# Patient Record
Sex: FEMALE | Race: BLACK OR AFRICAN AMERICAN | Employment: FULL TIME | URBAN - METROPOLITAN AREA
[De-identification: names, ages, dates, MRNs, and addresses within clinical notes are randomized per-mention and may not be internally consistent; named-entity substitution may affect disease eponyms.]

---

## 2017-03-03 DIAGNOSIS — Z12.39 BREAST CANCER SCREENING: Primary | ICD-10-CM

## 2017-04-22 ENCOUNTER — OFFICE VISIT (OUTPATIENT)
Dept: FAMILY MEDICINE CLINIC | Age: 52
End: 2017-04-22

## 2017-04-22 VITALS
OXYGEN SATURATION: 98 % | BODY MASS INDEX: 39.82 KG/M2 | TEMPERATURE: 97.8 F | WEIGHT: 232 LBS | DIASTOLIC BLOOD PRESSURE: 80 MMHG | RESPIRATION RATE: 22 BRPM | SYSTOLIC BLOOD PRESSURE: 136 MMHG | HEART RATE: 84 BPM

## 2017-04-22 DIAGNOSIS — E11.21 TYPE 2 DIABETES MELLITUS WITH DIABETIC NEPHROPATHY, WITHOUT LONG-TERM CURRENT USE OF INSULIN (HCC): ICD-10-CM

## 2017-04-22 DIAGNOSIS — D25.9 UTERINE LEIOMYOMA, UNSPECIFIED LOCATION: ICD-10-CM

## 2017-04-22 DIAGNOSIS — J06.9 UPPER RESPIRATORY TRACT INFECTION, UNSPECIFIED TYPE: Primary | ICD-10-CM

## 2017-04-22 DIAGNOSIS — R19.00 INTRA-ABDOMINAL AND PELVIC SWELLING, MASS AND LUMP, UNSPECIFIED SITE: ICD-10-CM

## 2017-04-22 LAB
A/G RATIO: 1.3 (ref 1.1–2.2)
ALBUMIN SERPL-MCNC: 4.4 G/DL (ref 3.4–5)
ALP BLD-CCNC: 80 U/L (ref 40–129)
ALT SERPL-CCNC: 24 U/L (ref 10–40)
ANION GAP SERPL CALCULATED.3IONS-SCNC: 15 MMOL/L (ref 3–16)
AST SERPL-CCNC: 36 U/L (ref 15–37)
BASOPHILS ABSOLUTE: 0 K/UL (ref 0–0.2)
BASOPHILS RELATIVE PERCENT: 0.3 %
BILIRUB SERPL-MCNC: 0.4 MG/DL (ref 0–1)
BUN BLDV-MCNC: 13 MG/DL (ref 7–20)
CALCIUM SERPL-MCNC: 9.9 MG/DL (ref 8.3–10.6)
CHLORIDE BLD-SCNC: 99 MMOL/L (ref 99–110)
CO2: 27 MMOL/L (ref 21–32)
CREAT SERPL-MCNC: 0.8 MG/DL (ref 0.6–1.1)
EOSINOPHILS ABSOLUTE: 0.3 K/UL (ref 0–0.6)
EOSINOPHILS RELATIVE PERCENT: 4.8 %
GFR AFRICAN AMERICAN: >60
GFR NON-AFRICAN AMERICAN: >60
GLOBULIN: 3.3 G/DL
GLUCOSE BLD-MCNC: 118 MG/DL (ref 70–99)
HCT VFR BLD CALC: 37.9 % (ref 36–48)
HEMOGLOBIN: 12 G/DL (ref 12–16)
INFLUENZA A ANTIBODY: NORMAL
INFLUENZA B ANTIBODY: NORMAL
LYMPHOCYTES ABSOLUTE: 1.2 K/UL (ref 1–5.1)
LYMPHOCYTES RELATIVE PERCENT: 21.8 %
MCH RBC QN AUTO: 28 PG (ref 26–34)
MCHC RBC AUTO-ENTMCNC: 31.7 G/DL (ref 31–36)
MCV RBC AUTO: 88.5 FL (ref 80–100)
MONOCYTES ABSOLUTE: 0.4 K/UL (ref 0–1.3)
MONOCYTES RELATIVE PERCENT: 8.1 %
NEUTROPHILS ABSOLUTE: 3.6 K/UL (ref 1.7–7.7)
NEUTROPHILS RELATIVE PERCENT: 65 %
PDW BLD-RTO: 15.1 % (ref 12.4–15.4)
PLATELET # BLD: 216 K/UL (ref 135–450)
PMV BLD AUTO: 10.4 FL (ref 5–10.5)
POTASSIUM SERPL-SCNC: 4.4 MMOL/L (ref 3.5–5.1)
RBC # BLD: 4.28 M/UL (ref 4–5.2)
SODIUM BLD-SCNC: 141 MMOL/L (ref 136–145)
TOTAL PROTEIN: 7.7 G/DL (ref 6.4–8.2)
WBC # BLD: 5.5 K/UL (ref 4–11)

## 2017-04-22 PROCEDURE — 87804 INFLUENZA ASSAY W/OPTIC: CPT | Performed by: INTERNAL MEDICINE

## 2017-04-22 PROCEDURE — 99214 OFFICE O/P EST MOD 30 MIN: CPT | Performed by: INTERNAL MEDICINE

## 2017-04-22 PROCEDURE — 36415 COLL VENOUS BLD VENIPUNCTURE: CPT | Performed by: INTERNAL MEDICINE

## 2017-04-22 ASSESSMENT — ENCOUNTER SYMPTOMS
WHEEZING: 0
SHORTNESS OF BREATH: 0
CONSTIPATION: 1
DIARRHEA: 0
SINUS PRESSURE: 1

## 2017-04-23 LAB
ESTIMATED AVERAGE GLUCOSE: 151.3 MG/DL
HBA1C MFR BLD: 6.9 %

## 2017-04-24 DIAGNOSIS — D25.9 UTERINE LEIOMYOMA, UNSPECIFIED LOCATION: Primary | ICD-10-CM

## 2017-05-22 RX ORDER — GLIMEPIRIDE 4 MG/1
TABLET ORAL
Qty: 30 TABLET | Refills: 0 | Status: SHIPPED | OUTPATIENT
Start: 2017-05-22 | End: 2017-06-18 | Stop reason: SDUPTHER

## 2017-05-22 RX ORDER — LOVASTATIN 40 MG/1
TABLET ORAL
Qty: 30 TABLET | Refills: 0 | Status: SHIPPED | OUTPATIENT
Start: 2017-05-22 | End: 2017-06-18 | Stop reason: SDUPTHER

## 2017-05-22 RX ORDER — PIOGLITAZONEHYDROCHLORIDE 30 MG/1
TABLET ORAL
Qty: 30 TABLET | Refills: 4 | Status: SHIPPED | OUTPATIENT
Start: 2017-05-22 | End: 2017-08-25

## 2017-06-17 RX ORDER — ENALAPRIL MALEATE 5 MG/1
TABLET ORAL
Qty: 60 TABLET | Refills: 1 | Status: SHIPPED | OUTPATIENT
Start: 2017-06-17 | End: 2017-08-23 | Stop reason: SDUPTHER

## 2017-06-19 RX ORDER — GLIMEPIRIDE 4 MG/1
TABLET ORAL
Qty: 30 TABLET | Refills: 0 | Status: SHIPPED | OUTPATIENT
Start: 2017-06-19 | End: 2017-07-22 | Stop reason: SDUPTHER

## 2017-06-19 RX ORDER — LOVASTATIN 40 MG/1
TABLET ORAL
Qty: 30 TABLET | Refills: 0 | Status: SHIPPED | OUTPATIENT
Start: 2017-06-19 | End: 2017-07-22 | Stop reason: SDUPTHER

## 2017-07-24 RX ORDER — AMILORIDE HYDROCHLORIDE AND HYDROCHLOROTHIAZIDE 5; 50 MG/1; MG/1
TABLET ORAL
Qty: 30 TABLET | Refills: 3 | Status: SHIPPED | OUTPATIENT
Start: 2017-07-24 | End: 2017-11-25 | Stop reason: SDUPTHER

## 2017-07-24 RX ORDER — GLIMEPIRIDE 4 MG/1
TABLET ORAL
Qty: 30 TABLET | Refills: 3 | Status: SHIPPED | OUTPATIENT
Start: 2017-07-24 | End: 2017-11-25 | Stop reason: SDUPTHER

## 2017-07-24 RX ORDER — LOVASTATIN 40 MG/1
TABLET ORAL
Qty: 30 TABLET | Refills: 3 | Status: SHIPPED | OUTPATIENT
Start: 2017-07-24 | End: 2017-11-25 | Stop reason: SDUPTHER

## 2017-08-23 RX ORDER — ENALAPRIL MALEATE 5 MG/1
TABLET ORAL
Qty: 60 TABLET | Refills: 3 | Status: SHIPPED | OUTPATIENT
Start: 2017-08-23 | End: 2017-12-26 | Stop reason: SDUPTHER

## 2017-08-25 ENCOUNTER — OFFICE VISIT (OUTPATIENT)
Dept: FAMILY MEDICINE CLINIC | Age: 52
End: 2017-08-25

## 2017-08-25 VITALS
WEIGHT: 236 LBS | BODY MASS INDEX: 40.29 KG/M2 | HEART RATE: 80 BPM | DIASTOLIC BLOOD PRESSURE: 70 MMHG | RESPIRATION RATE: 18 BRPM | HEIGHT: 64 IN | SYSTOLIC BLOOD PRESSURE: 128 MMHG | TEMPERATURE: 98.8 F

## 2017-08-25 DIAGNOSIS — E78.00 PURE HYPERCHOLESTEROLEMIA: ICD-10-CM

## 2017-08-25 DIAGNOSIS — Z12.31 SCREENING MAMMOGRAM, ENCOUNTER FOR: ICD-10-CM

## 2017-08-25 DIAGNOSIS — E11.21 TYPE 2 DIABETES MELLITUS WITH DIABETIC NEPHROPATHY, WITHOUT LONG-TERM CURRENT USE OF INSULIN (HCC): ICD-10-CM

## 2017-08-25 DIAGNOSIS — I10 ESSENTIAL HYPERTENSION: Chronic | ICD-10-CM

## 2017-08-25 DIAGNOSIS — E66.01 MORBID OBESITY DUE TO EXCESS CALORIES (HCC): ICD-10-CM

## 2017-08-25 DIAGNOSIS — I10 ESSENTIAL HYPERTENSION: Primary | Chronic | ICD-10-CM

## 2017-08-25 LAB
A/G RATIO: 1.4 (ref 1.1–2.2)
ALBUMIN SERPL-MCNC: 4.4 G/DL (ref 3.4–5)
ALP BLD-CCNC: 62 U/L (ref 40–129)
ALT SERPL-CCNC: 8 U/L (ref 10–40)
ANION GAP SERPL CALCULATED.3IONS-SCNC: 14 MMOL/L (ref 3–16)
AST SERPL-CCNC: 12 U/L (ref 15–37)
BILIRUB SERPL-MCNC: 0.4 MG/DL (ref 0–1)
BUN BLDV-MCNC: 20 MG/DL (ref 7–20)
CALCIUM SERPL-MCNC: 9.8 MG/DL (ref 8.3–10.6)
CHLORIDE BLD-SCNC: 96 MMOL/L (ref 99–110)
CHOLESTEROL, TOTAL: 143 MG/DL (ref 0–199)
CO2: 26 MMOL/L (ref 21–32)
CREAT SERPL-MCNC: 0.8 MG/DL (ref 0.6–1.1)
CREATININE URINE: 57.8 MG/DL (ref 28–259)
GFR AFRICAN AMERICAN: >60
GFR NON-AFRICAN AMERICAN: >60
GLOBULIN: 3.1 G/DL
GLUCOSE BLD-MCNC: 115 MG/DL (ref 70–99)
HDLC SERPL-MCNC: 87 MG/DL (ref 40–60)
LDL CHOLESTEROL CALCULATED: 43 MG/DL
MICROALBUMIN UR-MCNC: 1.5 MG/DL
MICROALBUMIN/CREAT UR-RTO: 26 MG/G (ref 0–30)
POTASSIUM SERPL-SCNC: 4.5 MMOL/L (ref 3.5–5.1)
SODIUM BLD-SCNC: 136 MMOL/L (ref 136–145)
TOTAL PROTEIN: 7.5 G/DL (ref 6.4–8.2)
TRIGL SERPL-MCNC: 63 MG/DL (ref 0–150)
VLDLC SERPL CALC-MCNC: 13 MG/DL

## 2017-08-25 PROCEDURE — 99214 OFFICE O/P EST MOD 30 MIN: CPT | Performed by: FAMILY MEDICINE

## 2017-08-26 LAB
ESTIMATED AVERAGE GLUCOSE: 154.2 MG/DL
HBA1C MFR BLD: 7 %

## 2017-10-20 ENCOUNTER — OFFICE VISIT (OUTPATIENT)
Dept: FAMILY MEDICINE CLINIC | Age: 52
End: 2017-10-20

## 2017-10-20 VITALS
TEMPERATURE: 98.8 F | DIASTOLIC BLOOD PRESSURE: 88 MMHG | RESPIRATION RATE: 18 BRPM | SYSTOLIC BLOOD PRESSURE: 130 MMHG | HEART RATE: 79 BPM | BODY MASS INDEX: 41.21 KG/M2 | WEIGHT: 241.4 LBS | HEIGHT: 64 IN

## 2017-10-20 DIAGNOSIS — I10 ESSENTIAL HYPERTENSION: Chronic | ICD-10-CM

## 2017-10-20 DIAGNOSIS — Z12.11 SCREEN FOR COLON CANCER: ICD-10-CM

## 2017-10-20 DIAGNOSIS — E78.00 PURE HYPERCHOLESTEROLEMIA: ICD-10-CM

## 2017-10-20 DIAGNOSIS — E66.01 MORBID OBESITY DUE TO EXCESS CALORIES (HCC): ICD-10-CM

## 2017-10-20 DIAGNOSIS — E11.21 TYPE 2 DIABETES MELLITUS WITH DIABETIC NEPHROPATHY, WITHOUT LONG-TERM CURRENT USE OF INSULIN (HCC): ICD-10-CM

## 2017-10-20 DIAGNOSIS — Z12.31 ENCOUNTER FOR SCREENING MAMMOGRAM FOR BREAST CANCER: ICD-10-CM

## 2017-10-20 DIAGNOSIS — Z00.01 ENCOUNTER FOR WELL ADULT EXAM WITH ABNORMAL FINDINGS: Primary | ICD-10-CM

## 2017-10-20 PROBLEM — R19.00 INTRA-ABDOMINAL AND PELVIC SWELLING, MASS AND LUMP, UNSPECIFIED SITE: Status: RESOLVED | Noted: 2017-04-22 | Resolved: 2017-10-20

## 2017-10-20 PROCEDURE — 90471 IMMUNIZATION ADMIN: CPT | Performed by: FAMILY MEDICINE

## 2017-10-20 PROCEDURE — 90630 INFLUENZA, QUADV, 18-64 YRS, ID, PF, MICRO INJ, 0.1ML (FLUZONE QUADV, PF): CPT | Performed by: FAMILY MEDICINE

## 2017-10-20 PROCEDURE — 99396 PREV VISIT EST AGE 40-64: CPT | Performed by: FAMILY MEDICINE

## 2017-10-20 ASSESSMENT — PATIENT HEALTH QUESTIONNAIRE - PHQ9
SUM OF ALL RESPONSES TO PHQ QUESTIONS 1-9: 0
SUM OF ALL RESPONSES TO PHQ9 QUESTIONS 1 & 2: 0
1. LITTLE INTEREST OR PLEASURE IN DOING THINGS: 0
2. FEELING DOWN, DEPRESSED OR HOPELESS: 0

## 2017-10-20 ASSESSMENT — ENCOUNTER SYMPTOMS
GASTROINTESTINAL NEGATIVE: 1
RESPIRATORY NEGATIVE: 1
ALLERGIC/IMMUNOLOGIC NEGATIVE: 1
EYES NEGATIVE: 1

## 2017-10-20 NOTE — PROGRESS NOTES
Vaccine Information Sheet, \"Influenza - Inactivated\"  given to Galo Ramey, or parent/legal guardian of  Galo Ramey and verbalized understanding. Patient responses:    Have you ever had a reaction to a flu vaccine? No  Are you able to eat eggs without adverse effects? Yes  Do you have any current illness? No  Have you ever had Guillian [place] Syndrome? No    Flu vaccine given per order. Please see immunization tab.

## 2017-10-24 RX ORDER — PIOGLITAZONEHYDROCHLORIDE 30 MG/1
TABLET ORAL
Qty: 30 TABLET | Refills: 5 | Status: SHIPPED | OUTPATIENT
Start: 2017-10-24 | End: 2018-04-09

## 2017-11-20 ENCOUNTER — HOSPITAL ENCOUNTER (OUTPATIENT)
Dept: OTHER | Age: 52
Discharge: HOME | End: 2017-11-20
Attending: FAMILY MEDICINE | Admitting: FAMILY MEDICINE

## 2017-11-20 ENCOUNTER — OFFICE VISIT (OUTPATIENT)
Dept: FAMILY MEDICINE CLINIC | Age: 52
End: 2017-11-20

## 2017-11-20 VITALS
DIASTOLIC BLOOD PRESSURE: 86 MMHG | HEIGHT: 64 IN | BODY MASS INDEX: 41.48 KG/M2 | WEIGHT: 243 LBS | SYSTOLIC BLOOD PRESSURE: 130 MMHG | HEART RATE: 75 BPM | RESPIRATION RATE: 14 BRPM | TEMPERATURE: 98.8 F

## 2017-11-20 DIAGNOSIS — M25.511 ACUTE PAIN OF RIGHT SHOULDER: Primary | ICD-10-CM

## 2017-11-20 DIAGNOSIS — M25.511 ACUTE PAIN OF RIGHT SHOULDER: ICD-10-CM

## 2017-11-20 PROCEDURE — 3017F COLORECTAL CA SCREEN DOC REV: CPT | Performed by: FAMILY MEDICINE

## 2017-11-20 PROCEDURE — G8484 FLU IMMUNIZE NO ADMIN: HCPCS | Performed by: FAMILY MEDICINE

## 2017-11-20 PROCEDURE — 99213 OFFICE O/P EST LOW 20 MIN: CPT | Performed by: FAMILY MEDICINE

## 2017-11-20 PROCEDURE — 1036F TOBACCO NON-USER: CPT | Performed by: FAMILY MEDICINE

## 2017-11-20 PROCEDURE — 3014F SCREEN MAMMO DOC REV: CPT | Performed by: FAMILY MEDICINE

## 2017-11-20 PROCEDURE — G8417 CALC BMI ABV UP PARAM F/U: HCPCS | Performed by: FAMILY MEDICINE

## 2017-11-20 PROCEDURE — G8427 DOCREV CUR MEDS BY ELIG CLIN: HCPCS | Performed by: FAMILY MEDICINE

## 2017-11-20 RX ORDER — IBUPROFEN 400 MG/1
400 TABLET ORAL EVERY 6 HOURS PRN
COMMUNITY
End: 2020-02-17

## 2017-11-20 RX ORDER — MELOXICAM 15 MG/1
15 TABLET ORAL DAILY
Qty: 30 TABLET | Refills: 0 | Status: SHIPPED | OUTPATIENT
Start: 2017-11-20 | End: 2018-04-09

## 2017-11-21 DIAGNOSIS — G89.29 CHRONIC RIGHT SHOULDER PAIN: ICD-10-CM

## 2017-11-21 DIAGNOSIS — R29.898 SHOULDER WEAKNESS: Primary | ICD-10-CM

## 2017-11-21 DIAGNOSIS — M25.511 CHRONIC RIGHT SHOULDER PAIN: ICD-10-CM

## 2017-11-27 RX ORDER — GLIMEPIRIDE 4 MG/1
TABLET ORAL
Qty: 30 TABLET | Refills: 2 | Status: SHIPPED | OUTPATIENT
Start: 2017-11-27 | End: 2018-02-24 | Stop reason: SDUPTHER

## 2017-11-27 RX ORDER — LOVASTATIN 40 MG/1
TABLET ORAL
Qty: 30 TABLET | Refills: 2 | Status: SHIPPED | OUTPATIENT
Start: 2017-11-27 | End: 2018-02-24 | Stop reason: SDUPTHER

## 2017-11-27 RX ORDER — AMILORIDE HYDROCHLORIDE AND HYDROCHLOROTHIAZIDE 5; 50 MG/1; MG/1
TABLET ORAL
Qty: 30 TABLET | Refills: 2 | Status: SHIPPED | OUTPATIENT
Start: 2017-11-27 | End: 2018-02-24 | Stop reason: SDUPTHER

## 2017-12-04 ENCOUNTER — HOSPITAL ENCOUNTER (OUTPATIENT)
Dept: OTHER | Age: 52
Discharge: HOME | End: 2017-12-31
Attending: FAMILY MEDICINE | Admitting: FAMILY MEDICINE

## 2017-12-26 RX ORDER — ENALAPRIL MALEATE 5 MG/1
TABLET ORAL
Qty: 60 TABLET | Refills: 0 | Status: SHIPPED | OUTPATIENT
Start: 2017-12-26 | End: 2018-01-25 | Stop reason: SDUPTHER

## 2018-01-01 ENCOUNTER — HOSPITAL ENCOUNTER (OUTPATIENT)
Dept: OTHER | Age: 53
Discharge: HOME | End: 2018-01-10
Attending: FAMILY MEDICINE | Admitting: FAMILY MEDICINE

## 2018-01-25 RX ORDER — ENALAPRIL MALEATE 5 MG/1
TABLET ORAL
Qty: 60 TABLET | Refills: 1 | Status: SHIPPED | OUTPATIENT
Start: 2018-01-25 | End: 2018-04-04 | Stop reason: SDUPTHER

## 2018-01-25 NOTE — TELEPHONE ENCOUNTER
Spoke to Pt about her mediations being refilled and that she is due for a follow up about her DM and pap. The pt now has both appt scheduled in March. Thanks.

## 2018-02-26 RX ORDER — LOVASTATIN 40 MG/1
TABLET ORAL
Qty: 30 TABLET | Refills: 1 | Status: SHIPPED | OUTPATIENT
Start: 2018-02-26 | End: 2018-04-30 | Stop reason: SDUPTHER

## 2018-02-26 RX ORDER — AMILORIDE HYDROCHLORIDE AND HYDROCHLOROTHIAZIDE 5; 50 MG/1; MG/1
TABLET ORAL
Qty: 30 TABLET | Refills: 1 | Status: SHIPPED | OUTPATIENT
Start: 2018-02-26 | End: 2018-04-30 | Stop reason: SDUPTHER

## 2018-02-26 RX ORDER — GLIMEPIRIDE 4 MG/1
TABLET ORAL
Qty: 30 TABLET | Refills: 1 | Status: SHIPPED | OUTPATIENT
Start: 2018-02-26 | End: 2018-04-30 | Stop reason: SDUPTHER

## 2018-04-09 ENCOUNTER — TELEPHONE (OUTPATIENT)
Dept: FAMILY MEDICINE CLINIC | Age: 53
End: 2018-04-09

## 2018-04-09 ENCOUNTER — HOSPITAL ENCOUNTER (OUTPATIENT)
Dept: OTHER | Age: 53
Discharge: HOME | End: 2018-04-09
Attending: FAMILY MEDICINE | Admitting: FAMILY MEDICINE

## 2018-04-09 ENCOUNTER — OFFICE VISIT (OUTPATIENT)
Dept: FAMILY MEDICINE CLINIC | Age: 53
End: 2018-04-09

## 2018-04-09 VITALS
WEIGHT: 247 LBS | SYSTOLIC BLOOD PRESSURE: 126 MMHG | TEMPERATURE: 98.4 F | BODY MASS INDEX: 42.17 KG/M2 | HEIGHT: 64 IN | DIASTOLIC BLOOD PRESSURE: 80 MMHG | RESPIRATION RATE: 12 BRPM | HEART RATE: 86 BPM

## 2018-04-09 DIAGNOSIS — Z12.31 SCREENING MAMMOGRAM, ENCOUNTER FOR: ICD-10-CM

## 2018-04-09 DIAGNOSIS — E11.21 TYPE 2 DIABETES MELLITUS WITH DIABETIC NEPHROPATHY, WITHOUT LONG-TERM CURRENT USE OF INSULIN (HCC): ICD-10-CM

## 2018-04-09 DIAGNOSIS — M19.90 ARTHRITIS: ICD-10-CM

## 2018-04-09 DIAGNOSIS — M17.0 PRIMARY OSTEOARTHRITIS OF BOTH KNEES: Primary | ICD-10-CM

## 2018-04-09 DIAGNOSIS — I10 ESSENTIAL HYPERTENSION: Primary | Chronic | ICD-10-CM

## 2018-04-09 DIAGNOSIS — Z12.11 COLON CANCER SCREENING: ICD-10-CM

## 2018-04-09 DIAGNOSIS — E66.01 MORBID OBESITY DUE TO EXCESS CALORIES (HCC): ICD-10-CM

## 2018-04-09 DIAGNOSIS — E78.00 PURE HYPERCHOLESTEROLEMIA: ICD-10-CM

## 2018-04-09 LAB — HBA1C MFR BLD: 10.3 %

## 2018-04-09 PROCEDURE — 99214 OFFICE O/P EST MOD 30 MIN: CPT | Performed by: FAMILY MEDICINE

## 2018-04-09 PROCEDURE — 83036 HEMOGLOBIN GLYCOSYLATED A1C: CPT | Performed by: FAMILY MEDICINE

## 2018-04-09 RX ORDER — CELECOXIB 100 MG/1
100 CAPSULE ORAL DAILY
Qty: 30 CAPSULE | Refills: 3 | Status: SHIPPED | OUTPATIENT
Start: 2018-04-09 | End: 2018-06-01

## 2018-04-18 ENCOUNTER — TELEPHONE (OUTPATIENT)
Dept: FAMILY MEDICINE CLINIC | Age: 53
End: 2018-04-18

## 2018-04-24 DIAGNOSIS — Z12.11 COLON CANCER SCREENING: ICD-10-CM

## 2018-04-24 LAB
CONTROL: NORMAL
HEMOCCULT STL QL: NORMAL

## 2018-04-24 PROCEDURE — 82274 ASSAY TEST FOR BLOOD FECAL: CPT | Performed by: FAMILY MEDICINE

## 2018-06-01 ENCOUNTER — OFFICE VISIT (OUTPATIENT)
Dept: FAMILY MEDICINE CLINIC | Age: 53
End: 2018-06-01

## 2018-06-01 VITALS
RESPIRATION RATE: 16 BRPM | TEMPERATURE: 98.7 F | DIASTOLIC BLOOD PRESSURE: 64 MMHG | SYSTOLIC BLOOD PRESSURE: 112 MMHG | BODY MASS INDEX: 40.12 KG/M2 | OXYGEN SATURATION: 95 % | WEIGHT: 235 LBS | HEART RATE: 86 BPM | HEIGHT: 64 IN

## 2018-06-01 DIAGNOSIS — T78.3XXA ANGIOEDEMA, INITIAL ENCOUNTER: Primary | ICD-10-CM

## 2018-06-01 PROCEDURE — 99213 OFFICE O/P EST LOW 20 MIN: CPT | Performed by: FAMILY MEDICINE

## 2018-06-01 RX ORDER — LOSARTAN POTASSIUM 50 MG/1
50 TABLET ORAL DAILY
Qty: 30 TABLET | Refills: 3 | Status: SHIPPED | OUTPATIENT
Start: 2018-06-01 | End: 2018-09-26 | Stop reason: SDUPTHER

## 2018-09-26 RX ORDER — LOSARTAN POTASSIUM 50 MG/1
TABLET ORAL
Qty: 30 TABLET | Refills: 2 | Status: SHIPPED | OUTPATIENT
Start: 2018-09-26 | End: 2019-01-03 | Stop reason: SDUPTHER

## 2018-10-05 ENCOUNTER — OFFICE VISIT (OUTPATIENT)
Dept: FAMILY MEDICINE CLINIC | Age: 53
End: 2018-10-05
Payer: COMMERCIAL

## 2018-10-05 VITALS
HEART RATE: 75 BPM | SYSTOLIC BLOOD PRESSURE: 120 MMHG | WEIGHT: 216 LBS | HEIGHT: 64 IN | RESPIRATION RATE: 18 BRPM | DIASTOLIC BLOOD PRESSURE: 70 MMHG | BODY MASS INDEX: 36.88 KG/M2

## 2018-10-05 DIAGNOSIS — E11.21 TYPE 2 DIABETES MELLITUS WITH DIABETIC NEPHROPATHY, WITHOUT LONG-TERM CURRENT USE OF INSULIN (HCC): Primary | ICD-10-CM

## 2018-10-05 DIAGNOSIS — Z12.31 SCREENING MAMMOGRAM, ENCOUNTER FOR: ICD-10-CM

## 2018-10-05 DIAGNOSIS — I10 ESSENTIAL HYPERTENSION: Chronic | ICD-10-CM

## 2018-10-05 DIAGNOSIS — E11.21 TYPE 2 DIABETES MELLITUS WITH DIABETIC NEPHROPATHY, WITHOUT LONG-TERM CURRENT USE OF INSULIN (HCC): ICD-10-CM

## 2018-10-05 DIAGNOSIS — E66.01 MORBID OBESITY DUE TO EXCESS CALORIES (HCC): ICD-10-CM

## 2018-10-05 DIAGNOSIS — E78.00 PURE HYPERCHOLESTEROLEMIA: ICD-10-CM

## 2018-10-05 LAB
A/G RATIO: 1.1 (ref 1.1–2.2)
ALBUMIN SERPL-MCNC: 4.4 G/DL (ref 3.4–5)
ALP BLD-CCNC: 102 U/L (ref 40–129)
ALT SERPL-CCNC: 13 U/L (ref 10–40)
ANION GAP SERPL CALCULATED.3IONS-SCNC: 13 MMOL/L (ref 3–16)
AST SERPL-CCNC: 13 U/L (ref 15–37)
BILIRUB SERPL-MCNC: 0.9 MG/DL (ref 0–1)
BUN BLDV-MCNC: 18 MG/DL (ref 7–20)
CALCIUM SERPL-MCNC: 10.2 MG/DL (ref 8.3–10.6)
CHLORIDE BLD-SCNC: 97 MMOL/L (ref 99–110)
CHOLESTEROL, TOTAL: 100 MG/DL (ref 0–199)
CO2: 27 MMOL/L (ref 21–32)
CREAT SERPL-MCNC: 0.9 MG/DL (ref 0.6–1.1)
CREATININE URINE: 58.9 MG/DL (ref 28–259)
GFR AFRICAN AMERICAN: >60
GFR NON-AFRICAN AMERICAN: >60
GLOBULIN: 4 G/DL
GLUCOSE BLD-MCNC: 256 MG/DL (ref 70–99)
HBA1C MFR BLD: 10.7 %
HDLC SERPL-MCNC: 32 MG/DL (ref 40–60)
LDL CHOLESTEROL CALCULATED: 48 MG/DL
MICROALBUMIN UR-MCNC: 5.6 MG/DL
MICROALBUMIN/CREAT UR-RTO: 95.1 MG/G (ref 0–30)
POTASSIUM SERPL-SCNC: 4.2 MMOL/L (ref 3.5–5.1)
SODIUM BLD-SCNC: 137 MMOL/L (ref 136–145)
TOTAL PROTEIN: 8.4 G/DL (ref 6.4–8.2)
TRIGL SERPL-MCNC: 99 MG/DL (ref 0–150)
VLDLC SERPL CALC-MCNC: 20 MG/DL

## 2018-10-05 PROCEDURE — 83036 HEMOGLOBIN GLYCOSYLATED A1C: CPT | Performed by: FAMILY MEDICINE

## 2018-10-05 PROCEDURE — 99214 OFFICE O/P EST MOD 30 MIN: CPT | Performed by: FAMILY MEDICINE

## 2018-10-05 ASSESSMENT — PATIENT HEALTH QUESTIONNAIRE - PHQ9
2. FEELING DOWN, DEPRESSED OR HOPELESS: 0
SUM OF ALL RESPONSES TO PHQ QUESTIONS 1-9: 0
SUM OF ALL RESPONSES TO PHQ9 QUESTIONS 1 & 2: 0
SUM OF ALL RESPONSES TO PHQ QUESTIONS 1-9: 0
1. LITTLE INTEREST OR PLEASURE IN DOING THINGS: 0

## 2018-10-05 NOTE — PROGRESS NOTES
10/5/2018    Blood pressure 120/70, pulse 75, resp. rate 18, height 5' 4\" (1.626 m), weight 216 lb (98 kg). Anam Alves (:  1965) is a 46 y.o. female, here for evaluation of the following medical concerns:    HTN: we changed from enalapril to losartan in  due to angioedema. She has had no recurrence. bp stable at home. No SE's noted. Also on amiloride/hct. No hx of CAD, TIA, CVA or PVD. Has normal renal function   Lab Results   Component Value Date     2017    K 4.5 2017    BUN 20 2017    CREATININE 0.8 2017     Cardia ROS: No chest pains, dizziness, heart palpitations, dyspnea, lightheadedness, worsening edema. DM: oral meds only: metformin, amaryl, jardiance. Tests sugars mainly in the evening, when she arises (works nights). She has lost 20 lbs since last visit. Has worked hard to eat less, walk more. Has seen eye doctor. No foot numbness or tingling. Lab Results   Component Value Date    LABA1C 10.3 2018     Lab Results   Component Value Date    .2 2017         Hyperlipidemia: on lova 40. Denies SE's. No myalgias. Is fasting for recheck. Lab Results   Component Value Date    CHOL 143 2017    TRIG 63 2017    HDL 87 (H) 2017    LDLCALC 43 2017     Lab Results   Component Value Date    ALT 8 (L) 2017    AST 12 (L) 2017         She uses nsaids rarely for joint pains. Patient Active Problem List   Diagnosis    Hypertension    Hyperlipidemia    Vitamin D deficiency    Solitary lung nodule- 5mm RML - rescan 2/15.  Type 2 diabetes mellitus with diabetic nephropathy (HCC)    Morbid obesity due to excess calories (HCC)    Uterine leiomyoma    Primary osteoarthritis of both knees        Body mass index is 37.08 kg/m².     Wt Readings from Last 3 Encounters:   10/05/18 216 lb (98 kg)   18 235 lb (106.6 kg)   18 247 lb (112 kg)       BP Readings from Last 3 hypertension  - Blood pressure is at goal on current therapy; continue meds and monitoring. Regular physical activity and healthy diet (low sodium, multiple servings of produce daily) was recommended. Renal function to be assessed yearly.   - COMPREHENSIVE METABOLIC PANEL; Future    3. Pure hypercholesterolemia  - Stable; continue statin therapy for CVD preventin  - Lipid Panel; Future    4. Morbid obesity due to excess calories ([address]  - congratulated on wt loss. Follow up: 3 month(s)  She will get flu vaccine at work. An  electronic signature was used to authenticate this note.     --Neva Holbrook MD on 10/5/2018 at 10:22 AM

## 2018-10-09 DIAGNOSIS — R80.9 MICROALBUMINURIA: ICD-10-CM

## 2018-10-23 RX ORDER — EMPAGLIFLOZIN 25 MG/1
TABLET, FILM COATED ORAL
Qty: 30 TABLET | Refills: 4 | Status: SHIPPED | OUTPATIENT
Start: 2018-10-23 | End: 2019-04-07 | Stop reason: SDUPTHER

## 2018-10-30 RX ORDER — LOVASTATIN 40 MG/1
TABLET ORAL
Qty: 30 TABLET | Refills: 5 | Status: SHIPPED | OUTPATIENT
Start: 2018-10-30 | End: 2019-05-02 | Stop reason: SDUPTHER

## 2018-10-30 RX ORDER — GLIMEPIRIDE 4 MG/1
TABLET ORAL
Qty: 30 TABLET | Refills: 5 | Status: SHIPPED | OUTPATIENT
Start: 2018-10-30 | End: 2019-05-10 | Stop reason: SDUPTHER

## 2018-12-03 RX ORDER — AMILORIDE HYDROCHLORIDE AND HYDROCHLOROTHIAZIDE 5; 50 MG/1; MG/1
TABLET ORAL
Qty: 30 TABLET | Refills: 4 | Status: SHIPPED | OUTPATIENT
Start: 2018-12-03 | End: 2019-05-02 | Stop reason: SDUPTHER

## 2019-01-03 RX ORDER — LOSARTAN POTASSIUM 50 MG/1
TABLET ORAL
Qty: 30 TABLET | Refills: 1 | Status: SHIPPED | OUTPATIENT
Start: 2019-01-03 | End: 2019-03-06 | Stop reason: SDUPTHER

## 2019-02-26 ENCOUNTER — OFFICE VISIT (OUTPATIENT)
Dept: FAMILY MEDICINE CLINIC | Age: 54
End: 2019-02-26
Payer: COMMERCIAL

## 2019-02-26 VITALS
WEIGHT: 214 LBS | DIASTOLIC BLOOD PRESSURE: 84 MMHG | SYSTOLIC BLOOD PRESSURE: 130 MMHG | BODY MASS INDEX: 36.54 KG/M2 | HEIGHT: 64 IN | HEART RATE: 82 BPM

## 2019-02-26 DIAGNOSIS — Z12.31 SCREENING MAMMOGRAM, ENCOUNTER FOR: ICD-10-CM

## 2019-02-26 DIAGNOSIS — E78.00 PURE HYPERCHOLESTEROLEMIA: ICD-10-CM

## 2019-02-26 DIAGNOSIS — I10 ESSENTIAL HYPERTENSION: Chronic | ICD-10-CM

## 2019-02-26 DIAGNOSIS — Z12.11 SCREEN FOR COLON CANCER: ICD-10-CM

## 2019-02-26 DIAGNOSIS — J06.9 VIRAL URI: ICD-10-CM

## 2019-02-26 DIAGNOSIS — E11.21 TYPE 2 DIABETES MELLITUS WITH DIABETIC NEPHROPATHY, WITHOUT LONG-TERM CURRENT USE OF INSULIN (HCC): Primary | ICD-10-CM

## 2019-02-26 LAB — HBA1C MFR BLD: 7.8 %

## 2019-02-26 PROCEDURE — 83036 HEMOGLOBIN GLYCOSYLATED A1C: CPT | Performed by: FAMILY MEDICINE

## 2019-02-26 PROCEDURE — 99214 OFFICE O/P EST MOD 30 MIN: CPT | Performed by: FAMILY MEDICINE

## 2019-02-26 ASSESSMENT — PATIENT HEALTH QUESTIONNAIRE - PHQ9
SUM OF ALL RESPONSES TO PHQ QUESTIONS 1-9: 0
1. LITTLE INTEREST OR PLEASURE IN DOING THINGS: 0
SUM OF ALL RESPONSES TO PHQ QUESTIONS 1-9: 0
2. FEELING DOWN, DEPRESSED OR HOPELESS: 0
SUM OF ALL RESPONSES TO PHQ9 QUESTIONS 1 & 2: 0

## 2019-05-10 RX ORDER — GLIMEPIRIDE 4 MG/1
TABLET ORAL
Qty: 30 TABLET | Refills: 4 | Status: SHIPPED | OUTPATIENT
Start: 2019-05-10 | End: 2019-08-20 | Stop reason: SDUPTHER

## 2019-08-06 ENCOUNTER — TELEPHONE (OUTPATIENT)
Dept: FAMILY MEDICINE CLINIC | Age: 54
End: 2019-08-06

## 2019-08-12 ENCOUNTER — TELEPHONE (OUTPATIENT)
Dept: FAMILY MEDICINE CLINIC | Age: 54
End: 2019-08-12

## 2019-08-12 RX ORDER — HYDROCHLOROTHIAZIDE 50 MG/1
50 TABLET ORAL DAILY
Qty: 30 TABLET | Refills: 1 | Status: SHIPPED | OUTPATIENT
Start: 2019-08-12 | End: 2019-10-08 | Stop reason: SDUPTHER

## 2019-08-12 RX ORDER — AMILORIDE HYDROCHLORIDE 5 MG/1
5 TABLET ORAL DAILY
Qty: 30 TABLET | Refills: 1 | Status: SHIPPED | OUTPATIENT
Start: 2019-08-12 | End: 2019-10-08 | Stop reason: SDUPTHER

## 2019-08-12 RX ORDER — AMILORIDE HYDROCHLORIDE AND HYDROCHLOROTHIAZIDE 5; 50 MG/1; MG/1
TABLET ORAL
Qty: 30 TABLET | Refills: 1 | Status: SHIPPED | OUTPATIENT
Start: 2019-08-12 | End: 2019-08-20

## 2019-08-12 NOTE — TELEPHONE ENCOUNTER
Checking on this. Misty Vanegas, they will be faxing me a list of med. But her plan only covers Tier 1 meds.  He will send over a list.

## 2019-08-12 NOTE — TELEPHONE ENCOUNTER
Pt needs a refill on her invokana 300MG   Her insurance is not covering this  Pt needs a PA on this med  Carly Medeiros is [address]  Please advise

## 2019-08-13 RX ORDER — LOSARTAN POTASSIUM 50 MG/1
TABLET ORAL
Qty: 30 TABLET | Refills: 5 | Status: SHIPPED | OUTPATIENT
Start: 2019-08-13 | End: 2020-03-05 | Stop reason: SDUPTHER

## 2019-08-19 RX ORDER — LOVASTATIN 40 MG/1
TABLET ORAL
Qty: 30 TABLET | Refills: 5 | Status: SHIPPED | OUTPATIENT
Start: 2019-08-19 | End: 2019-12-23

## 2019-08-20 ENCOUNTER — OFFICE VISIT (OUTPATIENT)
Dept: FAMILY MEDICINE CLINIC | Age: 54
End: 2019-08-20
Payer: COMMERCIAL

## 2019-08-20 VITALS
SYSTOLIC BLOOD PRESSURE: 130 MMHG | HEART RATE: 82 BPM | DIASTOLIC BLOOD PRESSURE: 80 MMHG | TEMPERATURE: 98.5 F | HEIGHT: 64 IN | BODY MASS INDEX: 38.07 KG/M2 | OXYGEN SATURATION: 97 % | WEIGHT: 223 LBS

## 2019-08-20 DIAGNOSIS — E66.01 MORBID OBESITY DUE TO EXCESS CALORIES (HCC): ICD-10-CM

## 2019-08-20 DIAGNOSIS — Z12.11 SCREEN FOR COLON CANCER: ICD-10-CM

## 2019-08-20 DIAGNOSIS — Z12.31 SCREENING MAMMOGRAM, ENCOUNTER FOR: ICD-10-CM

## 2019-08-20 DIAGNOSIS — E78.00 PURE HYPERCHOLESTEROLEMIA: ICD-10-CM

## 2019-08-20 DIAGNOSIS — E11.21 TYPE 2 DIABETES MELLITUS WITH DIABETIC NEPHROPATHY, WITHOUT LONG-TERM CURRENT USE OF INSULIN (HCC): Primary | ICD-10-CM

## 2019-08-20 DIAGNOSIS — I10 ESSENTIAL HYPERTENSION: ICD-10-CM

## 2019-08-20 LAB — HBA1C MFR BLD: 9 %

## 2019-08-20 PROCEDURE — 99214 OFFICE O/P EST MOD 30 MIN: CPT | Performed by: FAMILY MEDICINE

## 2019-08-20 PROCEDURE — 83036 HEMOGLOBIN GLYCOSYLATED A1C: CPT | Performed by: FAMILY MEDICINE

## 2019-08-20 RX ORDER — GLIMEPIRIDE 4 MG/1
TABLET ORAL
Qty: 90 TABLET | Refills: 1 | Status: SHIPPED | OUTPATIENT
Start: 2019-08-20 | End: 2019-11-05

## 2019-08-23 DIAGNOSIS — E11.21 TYPE 2 DIABETES MELLITUS WITH DIABETIC NEPHROPATHY, WITHOUT LONG-TERM CURRENT USE OF INSULIN (HCC): ICD-10-CM

## 2019-08-23 DIAGNOSIS — E78.00 PURE HYPERCHOLESTEROLEMIA: ICD-10-CM

## 2019-08-23 DIAGNOSIS — I10 ESSENTIAL HYPERTENSION: ICD-10-CM

## 2019-08-23 LAB
A/G RATIO: 1.4 (ref 1.1–2.2)
ALBUMIN SERPL-MCNC: 4.6 G/DL (ref 3.4–5)
ALP BLD-CCNC: 91 U/L (ref 40–129)
ALT SERPL-CCNC: 12 U/L (ref 10–40)
ANION GAP SERPL CALCULATED.3IONS-SCNC: 15 MMOL/L (ref 3–16)
AST SERPL-CCNC: 13 U/L (ref 15–37)
BILIRUB SERPL-MCNC: 0.5 MG/DL (ref 0–1)
BUN BLDV-MCNC: 18 MG/DL (ref 7–20)
CALCIUM SERPL-MCNC: 10.2 MG/DL (ref 8.3–10.6)
CHLORIDE BLD-SCNC: 98 MMOL/L (ref 99–110)
CHOLESTEROL, TOTAL: 114 MG/DL (ref 0–199)
CO2: 27 MMOL/L (ref 21–32)
CREAT SERPL-MCNC: 0.7 MG/DL (ref 0.6–1.1)
CREATININE URINE: 129.5 MG/DL (ref 28–259)
GFR AFRICAN AMERICAN: >60
GFR NON-AFRICAN AMERICAN: >60
GLOBULIN: 3.2 G/DL
GLUCOSE BLD-MCNC: 268 MG/DL (ref 70–99)
HDLC SERPL-MCNC: 60 MG/DL (ref 40–60)
LDL CHOLESTEROL CALCULATED: 40 MG/DL
MICROALBUMIN UR-MCNC: 13.7 MG/DL
MICROALBUMIN/CREAT UR-RTO: 105.8 MG/G (ref 0–30)
POTASSIUM SERPL-SCNC: 4.4 MMOL/L (ref 3.5–5.1)
SODIUM BLD-SCNC: 140 MMOL/L (ref 136–145)
TOTAL PROTEIN: 7.8 G/DL (ref 6.4–8.2)
TRIGL SERPL-MCNC: 70 MG/DL (ref 0–150)
VLDLC SERPL CALC-MCNC: 14 MG/DL

## 2019-08-28 ENCOUNTER — TELEPHONE (OUTPATIENT)
Dept: FAMILY MEDICINE CLINIC | Age: 54
End: 2019-08-28

## 2019-08-28 NOTE — TELEPHONE ENCOUNTER
Yes, see previous phone notes. Her current plan ONLY pays for generics. We are providing samples of Farziga 10 mg until she changes plan soon to have better prescription drug plan. At least that was what we discussed.

## 2019-08-28 NOTE — TELEPHONE ENCOUNTER
Pts Brittany Morris is not covered by current Insurance plan. Rivero Brilliant, and Invokana are excluded as well. These are not applicable for PA. Letter scanned into media on 8/13 lists covered alternatives.   Please advise

## 2019-09-11 ENCOUNTER — TELEPHONE (OUTPATIENT)
Dept: FAMILY MEDICINE CLINIC | Age: 54
End: 2019-09-11

## 2019-09-16 RX ORDER — GLIMEPIRIDE 4 MG/1
TABLET ORAL
Qty: 30 TABLET | Refills: 3 | Status: SHIPPED | OUTPATIENT
Start: 2019-09-16 | End: 2019-12-23

## 2019-10-08 RX ORDER — HYDROCHLOROTHIAZIDE 50 MG/1
TABLET ORAL
Qty: 30 TABLET | Refills: 2 | Status: SHIPPED | OUTPATIENT
Start: 2019-10-08 | End: 2020-01-06

## 2019-10-08 RX ORDER — AMILORIDE HYDROCHLORIDE 5 MG/1
TABLET ORAL
Qty: 30 TABLET | Refills: 2 | Status: SHIPPED | OUTPATIENT
Start: 2019-10-08 | End: 2020-01-06

## 2019-11-05 ENCOUNTER — OFFICE VISIT (OUTPATIENT)
Dept: FAMILY MEDICINE CLINIC | Age: 54
End: 2019-11-05
Payer: COMMERCIAL

## 2019-11-05 VITALS
HEIGHT: 64 IN | WEIGHT: 225 LBS | SYSTOLIC BLOOD PRESSURE: 138 MMHG | BODY MASS INDEX: 38.41 KG/M2 | OXYGEN SATURATION: 99 % | HEART RATE: 76 BPM | DIASTOLIC BLOOD PRESSURE: 84 MMHG

## 2019-11-05 DIAGNOSIS — Z23 NEED FOR INFLUENZA VACCINATION: ICD-10-CM

## 2019-11-05 DIAGNOSIS — E11.21 TYPE 2 DIABETES MELLITUS WITH DIABETIC NEPHROPATHY, WITHOUT LONG-TERM CURRENT USE OF INSULIN (HCC): Primary | ICD-10-CM

## 2019-11-05 LAB — HBA1C MFR BLD: 9.5 %

## 2019-11-05 PROCEDURE — 99214 OFFICE O/P EST MOD 30 MIN: CPT | Performed by: FAMILY MEDICINE

## 2019-11-05 PROCEDURE — 83036 HEMOGLOBIN GLYCOSYLATED A1C: CPT | Performed by: FAMILY MEDICINE

## 2019-12-23 DIAGNOSIS — E11.21 TYPE 2 DIABETES MELLITUS WITH DIABETIC NEPHROPATHY, WITHOUT LONG-TERM CURRENT USE OF INSULIN (HCC): Primary | ICD-10-CM

## 2019-12-23 RX ORDER — LOVASTATIN 40 MG/1
TABLET ORAL
Qty: 90 TABLET | Refills: 1 | Status: SHIPPED | OUTPATIENT
Start: 2019-12-23 | End: 2020-06-19

## 2019-12-23 RX ORDER — GLIMEPIRIDE 4 MG/1
TABLET ORAL
Qty: 90 TABLET | Refills: 1 | Status: SHIPPED | OUTPATIENT
Start: 2019-12-23 | End: 2020-06-19

## 2020-01-06 ENCOUNTER — TELEPHONE (OUTPATIENT)
Dept: FAMILY MEDICINE CLINIC | Age: 55
End: 2020-01-06

## 2020-01-06 RX ORDER — GLIMEPIRIDE 4 MG/1
TABLET ORAL
Qty: 90 TABLET | Refills: 1 | Status: CANCELLED | OUTPATIENT
Start: 2020-01-06

## 2020-01-20 NOTE — TELEPHONE ENCOUNTER
These refills were sent on 1/9/2020. I called pharmacy and they both need prior authorization.  I asked for them to fax the information to the office

## 2020-02-17 ENCOUNTER — OFFICE VISIT (OUTPATIENT)
Dept: FAMILY MEDICINE CLINIC | Age: 55
End: 2020-02-17
Payer: COMMERCIAL

## 2020-02-17 VITALS
HEIGHT: 64 IN | WEIGHT: 227 LBS | OXYGEN SATURATION: 98 % | HEART RATE: 80 BPM | BODY MASS INDEX: 38.76 KG/M2 | SYSTOLIC BLOOD PRESSURE: 146 MMHG | DIASTOLIC BLOOD PRESSURE: 88 MMHG

## 2020-02-17 LAB — HBA1C MFR BLD: 10.8 %

## 2020-02-17 PROCEDURE — 83036 HEMOGLOBIN GLYCOSYLATED A1C: CPT | Performed by: FAMILY MEDICINE

## 2020-02-17 PROCEDURE — 99214 OFFICE O/P EST MOD 30 MIN: CPT | Performed by: FAMILY MEDICINE

## 2020-02-17 ASSESSMENT — PATIENT HEALTH QUESTIONNAIRE - PHQ9
1. LITTLE INTEREST OR PLEASURE IN DOING THINGS: 0
SUM OF ALL RESPONSES TO PHQ QUESTIONS 1-9: 0
2. FEELING DOWN, DEPRESSED OR HOPELESS: 0
SUM OF ALL RESPONSES TO PHQ9 QUESTIONS 1 & 2: 0
SUM OF ALL RESPONSES TO PHQ QUESTIONS 1-9: 0

## 2020-02-17 NOTE — PROGRESS NOTES
Musculoskeletal: Normal range of motion and neck supple. Thyroid: No thyromegaly. Vascular: No carotid bruit. Cardiovascular:      Rate and Rhythm: Normal rate and regular rhythm. Pulses: Normal pulses. Heart sounds: Normal heart sounds. No murmur. Pulmonary:      Effort: Pulmonary effort is normal. No respiratory distress. Breath sounds: Normal breath sounds. No wheezing or rales. Musculoskeletal: Normal range of motion. Right lower leg: No edema. Left lower leg: No edema. Skin:     General: Skin is warm and dry. Neurological:      General: No focal deficit present. Mental Status: She is alert and oriented to person, place, and time. Mental status is at baseline. Psychiatric:         Mood and Affect: Mood normal.         Behavior: Behavior normal.         Thought Content: Thought content normal.         Judgment: Judgment normal.         Assessment:      1. Type 2 diabetes mellitus with diabetic nephropathy, without long-term current use of insulin (HCC)  - POCT glycosylated hemoglobin (Hb A1C) 10.8  Uncontrolled due to medication problems. Provided samples for Tresiba to resume basal  Insulin at prior dose 12 u/d and Farxiga 10mg. She is to call monthly to replenish her supply until  Her insurance situation improves and meds have better coverage (hopefuly May). Continue amaryl and metformin at current max doses    2. Screening mammogram, encounter for  - JOSE DIGITAL SCREEN W CAD BILATERAL; Future    3. Screen for colon cancer  - POCT Fecal Immunochemical Test (FIT); Future    4. Essential hypertension  - bp high, but not usually. Advised to stop daily ibuprofen for knee OA  She will monitor at home and report back if she is persistently out of range. Could increase losartan to 100 if needed    5. Primary osteoarthritis of both knees  - stop OTC IBU due to HTN. - try topical diclofenac.           Plan:      F/u 3 mo for bp check, a1c        Wilfred Hernandez Bertrand Jacome MD

## 2020-03-04 RX ORDER — HYDROCHLOROTHIAZIDE 50 MG/1
TABLET ORAL
Qty: 30 TABLET | Refills: 2 | Status: SHIPPED | OUTPATIENT
Start: 2020-03-04 | End: 2020-06-09

## 2020-03-04 RX ORDER — AMILORIDE HYDROCHLORIDE 5 MG/1
TABLET ORAL
Qty: 30 TABLET | Refills: 2 | Status: SHIPPED | OUTPATIENT
Start: 2020-03-04 | End: 2020-03-05 | Stop reason: SDUPTHER

## 2020-03-05 RX ORDER — LOSARTAN POTASSIUM 50 MG/1
TABLET ORAL
Qty: 30 TABLET | Refills: 2 | Status: SHIPPED | OUTPATIENT
Start: 2020-03-05 | End: 2020-06-08

## 2020-03-05 RX ORDER — AMILORIDE HYDROCHLORIDE 5 MG/1
TABLET ORAL
Qty: 30 TABLET | Refills: 2 | Status: SHIPPED | OUTPATIENT
Start: 2020-03-05 | End: 2020-06-09

## 2020-06-19 RX ORDER — LOVASTATIN 40 MG/1
TABLET ORAL
Qty: 30 TABLET | Refills: 0 | Status: SHIPPED | OUTPATIENT
Start: 2020-06-19 | End: 2020-07-23

## 2020-06-19 RX ORDER — GLIMEPIRIDE 4 MG/1
TABLET ORAL
Qty: 30 TABLET | Refills: 0 | Status: SHIPPED | OUTPATIENT
Start: 2020-06-19 | End: 2020-07-23

## 2020-07-06 ENCOUNTER — VIRTUAL VISIT (OUTPATIENT)
Dept: FAMILY MEDICINE CLINIC | Age: 55
End: 2020-07-06
Payer: COMMERCIAL

## 2020-07-06 PROCEDURE — 99214 OFFICE O/P EST MOD 30 MIN: CPT | Performed by: FAMILY MEDICINE

## 2020-07-06 RX ORDER — INSULIN DEGLUDEC INJECTION 100 U/ML
12 INJECTION, SOLUTION SUBCUTANEOUS DAILY
Qty: 2 PEN | Refills: 0 | Status: SHIPPED | COMMUNITY
Start: 2020-07-06 | End: 2020-12-08

## 2020-07-06 NOTE — Clinical Note
Also, call for retinal report from [facility] (see last scanned report for phone contact info).

## 2020-07-06 NOTE — PROGRESS NOTES
Barb Jacobsen MD   glimepiride (AMARYL) 4 MG tablet TAKE 1 TABLET BY MOUTH ONCE DAILY BEFORE BREAKFAST Yes Barb Jacobsen MD   diclofenac sodium (VOLTAREN) 1 % GEL APPLY 4  TOPICALLY 4 TIMES DAILY Yes Barb Jacobsen MD   hydroCHLOROthiazide (HYDRODIURIL) 50 MG tablet Take 1 tablet by mouth once daily Yes Barb Jacobsen MD   aMILoride [facility]) 5 MG tablet Take 1 tablet by mouth once daily Yes Barb Jacobsen MD   losartan (COZAAR) 50 MG tablet Take 1 tablet by mouth once daily Yes Barb Jacobsen MD   metFORMIN (GLUCOPHAGE) 1000 MG tablet TAKE ONE TABLET BY MOUTH TWICE A DAY WITH MEALS Yes Barb Jacobsen MD   Insulin Degludec (TRESIBA FLEXTOUCH) 100 UNIT/ML SOPN Inject 10 Units into the skin daily Yes Barb Jacobsen MD   Insulin Glargine, 2 Unit Dial, (TOUJEO MAX SOLOSTAR) 300 UNIT/ML SOPN Inject 12 Units into the skin daily Yes Barb Jacobsen MD   Insulin Pen Needle ([employer] PEN NEEDLES) 31G X 6 MM MISC 1 each by Does not apply route daily Yes Barb Jacobsen MD   Multiple Vitamins-Minerals (CENTRUM PO) Take  by mouth daily. Yes Historical Provider, MD   aspirin 81 MG EC tablet Take 81 mg by mouth daily. Yes Historical Provider, MD   dapagliflozin (FARXIGA) 10 MG tablet Take 1 tablet by mouth every morning for 28 days  Barb Jacobsen MD       Social History     Tobacco Use    Smoking status: Never Smoker    Smokeless tobacco: Never Used   Substance Use Topics    Alcohol use: No    Drug use: No            PHYSICAL EXAMINATION:  Physical Exam  Constitutional:       General: She is not in acute distress. Appearance: Normal appearance. She is not ill-appearing. Pulmonary:      Effort: Pulmonary effort is normal.   Skin:     General: Skin is warm. Neurological:      General: No focal deficit present. Mental Status: She is alert and oriented to person, place, and time. Mental status is at baseline.    Psychiatric:         Mood and been advised to contact this office for worsening conditions or problems, and seek emergency medical treatment and/or call 911 if deemed necessary. Patient identification was verified at the start of the visit: Yes    Total time spent on this encounter: 21 or more minutes were spent on the digital evaluation and management of this patient. Services were provided through a video synchronous discussion virtually to substitute for in-person clinic visit. Patient and provider were located at their individual homes. --Claudia Garcia MD on 7/6/2020 at 11:29 AM    An electronic signature was used to authenticate this note.

## 2020-07-10 DIAGNOSIS — I10 ESSENTIAL HYPERTENSION: Chronic | ICD-10-CM

## 2020-07-10 DIAGNOSIS — E78.00 PURE HYPERCHOLESTEROLEMIA: ICD-10-CM

## 2020-07-10 DIAGNOSIS — Z79.4 TYPE 2 DIABETES MELLITUS WITH DIABETIC NEPHROPATHY, WITH LONG-TERM CURRENT USE OF INSULIN (HCC): ICD-10-CM

## 2020-07-10 DIAGNOSIS — R80.9 MICROALBUMINURIA: ICD-10-CM

## 2020-07-10 DIAGNOSIS — E11.21 TYPE 2 DIABETES MELLITUS WITH DIABETIC NEPHROPATHY, WITH LONG-TERM CURRENT USE OF INSULIN (HCC): ICD-10-CM

## 2020-07-10 LAB
A/G RATIO: 1.3 (ref 1.1–2.2)
ALBUMIN SERPL-MCNC: 4.2 G/DL (ref 3.4–5)
ALP BLD-CCNC: 101 U/L (ref 40–129)
ALT SERPL-CCNC: 16 U/L (ref 10–40)
ANION GAP SERPL CALCULATED.3IONS-SCNC: 12 MMOL/L (ref 3–16)
AST SERPL-CCNC: 15 U/L (ref 15–37)
BILIRUB SERPL-MCNC: 0.5 MG/DL (ref 0–1)
BUN BLDV-MCNC: 12 MG/DL (ref 7–20)
CALCIUM SERPL-MCNC: 9.9 MG/DL (ref 8.3–10.6)
CHLORIDE BLD-SCNC: 99 MMOL/L (ref 99–110)
CHOLESTEROL, TOTAL: 138 MG/DL (ref 0–199)
CO2: 27 MMOL/L (ref 21–32)
CREAT SERPL-MCNC: 0.6 MG/DL (ref 0.6–1.1)
CREATININE URINE: 78.8 MG/DL (ref 28–259)
GFR AFRICAN AMERICAN: >60
GFR NON-AFRICAN AMERICAN: >60
GLOBULIN: 3.2 G/DL
GLUCOSE BLD-MCNC: 221 MG/DL (ref 70–99)
HDLC SERPL-MCNC: 69 MG/DL (ref 40–60)
LDL CHOLESTEROL CALCULATED: 51 MG/DL
MICROALBUMIN UR-MCNC: 19.7 MG/DL
MICROALBUMIN/CREAT UR-RTO: 250 MG/G (ref 0–30)
POTASSIUM SERPL-SCNC: 4.2 MMOL/L (ref 3.5–5.1)
SODIUM BLD-SCNC: 138 MMOL/L (ref 136–145)
TOTAL PROTEIN: 7.4 G/DL (ref 6.4–8.2)
TRIGL SERPL-MCNC: 92 MG/DL (ref 0–150)
VLDLC SERPL CALC-MCNC: 18 MG/DL

## 2020-07-10 RX ORDER — HYDROCHLOROTHIAZIDE 50 MG/1
TABLET ORAL
Qty: 30 TABLET | Refills: 3 | Status: SHIPPED | OUTPATIENT
Start: 2020-07-10 | End: 2020-11-11

## 2020-07-10 RX ORDER — AMILORIDE HYDROCHLORIDE 5 MG/1
TABLET ORAL
Qty: 30 TABLET | Refills: 3 | Status: SHIPPED | OUTPATIENT
Start: 2020-07-10 | End: 2020-11-11

## 2020-07-10 RX ORDER — LOSARTAN POTASSIUM 50 MG/1
TABLET ORAL
Qty: 30 TABLET | Refills: 3 | Status: SHIPPED | OUTPATIENT
Start: 2020-07-10 | End: 2020-11-11

## 2020-07-11 LAB
ESTIMATED AVERAGE GLUCOSE: 248.9 MG/DL
HBA1C MFR BLD: 10.3 %

## 2020-07-15 RX ORDER — INSULIN DEGLUDEC INJECTION 100 U/ML
10 INJECTION, SOLUTION SUBCUTANEOUS DAILY
Qty: 2 PEN | Refills: 0 | COMMUNITY
Start: 2020-07-15 | End: 2020-08-10 | Stop reason: SDUPTHER

## 2020-07-15 NOTE — TELEPHONE ENCOUNTER
She called to get samples of Farxiga 10 mg and [place] flex pen.   Given 3 boxes of farxiga and 2 boxes of [place]

## 2020-07-16 NOTE — TELEPHONE ENCOUNTER
Dr. Tricia Godoy increased her tresiba dose to 14 units qhs. She is supposed to be titrating up on her tresiba.

## 2020-08-12 ENCOUNTER — TELEPHONE (OUTPATIENT)
Dept: FAMILY MEDICINE CLINIC | Age: 55
End: 2020-08-12

## 2020-08-12 NOTE — TELEPHONE ENCOUNTER
Pt is calling to speak with Grover Patel  She is wondering if there are any fasigan samples that she can get.    Please advise

## 2020-09-02 ENCOUNTER — TELEPHONE (OUTPATIENT)
Dept: FAMILY MEDICINE CLINIC | Age: 55
End: 2020-09-02

## 2020-10-08 ENCOUNTER — TELEPHONE (OUTPATIENT)
Dept: FAMILY MEDICINE CLINIC | Age: 55
End: 2020-10-08

## 2020-10-08 RX ORDER — INSULIN DEGLUDEC INJECTION 100 U/ML
10 INJECTION, SOLUTION SUBCUTANEOUS DAILY
Qty: 1 PEN | Refills: 0 | COMMUNITY
Start: 2020-10-08 | End: 2020-12-08 | Stop reason: SDUPTHER

## 2020-10-08 NOTE — TELEPHONE ENCOUNTER
We have the tresiba 100 in stock. The farxiga we only have the 5mg here. Can we give for pt to take 2 at a time?

## 2020-10-08 NOTE — TELEPHONE ENCOUNTER
Pt calling because she would like to know if she can get tresiba and farxiga samples   Please advise

## 2020-10-13 ENCOUNTER — TELEPHONE (OUTPATIENT)
Dept: FAMILY MEDICINE CLINIC | Age: 55
End: 2020-10-13

## 2020-10-13 NOTE — TELEPHONE ENCOUNTER
Pt has a cough and a headache started yesterday   Pt wants antibiotics called in I advised she will need a appointment we do not have any vv available       Please advise

## 2020-10-14 ENCOUNTER — TELEMEDICINE (OUTPATIENT)
Dept: FAMILY MEDICINE CLINIC | Age: 55
End: 2020-10-14
Payer: COMMERCIAL

## 2020-10-14 PROCEDURE — 99213 OFFICE O/P EST LOW 20 MIN: CPT | Performed by: FAMILY MEDICINE

## 2020-10-14 NOTE — LETTER
[address] [facility] [address] [facility]  Phone: [phone]  Fax: [phone]    Saba Lora MD        October 14, 2020     Patient: Terese Chavez   YOB: 1965   Date of Visit: 10/14/2020       To Whom It May Concern: It is my medical opinion that Rain Ballesteros should remain out of work until 10/20/20 due to flu like symptoms that began 10/3/20. If you have any questions or concerns, please don't hesitate to call.     Sincerely,        Saba Lora MD

## 2020-10-14 NOTE — Clinical Note
Needs diabetes check up in office. Also please call her emplyoer to get fax number to send her work note.   [facility] [address], [address]

## 2020-10-14 NOTE — PROGRESS NOTES
10/14/2020    TELEHEALTH EVALUATION -- Audio/Visual (During XNNDK-96 public health emergency)    HPI:    Monty Guido (:  1965) has requested an audio/video evaluation for the following concern(s):    Chief Complaint   Patient presents with    Cough     cough is better, still has headache - had Covid 19 - neg      Onset of illness 3-4 days ago- mild headache, fatigue and cough without fever. Went for covid test Monday- resulted negative. Did not go to work Monday or Tues. She started feeling better yesterday. Feels normal this morning. No nasal congestion or RN. No headache  No n/v  No loss of sense of smell or taste  No body aches or pains  No fatigue. No ill contacts in family     Used nyquil. She is hoping to return to work today. 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 Review of Systems As above     Patient Active Problem List   Diagnosis    Hypertension    Hyperlipidemia    Vitamin D deficiency    Solitary lung nodule- 5mm RML - rescan 2/15.  Type 2 diabetes mellitus with diabetic nephropathy (HCC)    Morbid obesity due to excess calories ([address]    Uterine leiomyoma    Primary osteoarthritis of both knees    Microalbuminuria        Prior to Visit Medications    Medication Sig Taking?  Authorizing Provider   metFORMIN (GLUCOPHAGE) 1000 MG tablet TAKE 1 TABLET BY MOUTH TWICE DAILY WITH MEALS *CALL FOR APPOINTMENT* Yes Paige Rivera MD   Insulin Degludec (TRESIBA FLEXTOUCH) 100 UNIT/ML SOPN Inject 10 Units into the skin daily Yes Paige Rivera MD   dapagliflozin (FARXIGA) 5 MG tablet Take 2 tablets daily - for 10 mg Yes Paige Rivera MD   diclofenac sodium (VOLTAREN) 1 % GEL APPLY 4 GRAMS TOPICALLY 4 TIMES DAILY Yes Paige Rivera MD   Insulin Degludec (TRESIBA FLEXTOUCH) 100 UNIT/ML SOPN Inject 10 Units into the skin daily Yes Paige Rivera MD   Insulin Pen Needle (B-D UF III MINI PEN NEEDLES) 31G X 5 MM MISC 1 each by Does not apply route daily Yes Efrain Camacho MD   lovastatin (MEVACOR) 40 MG tablet Take 1 tablet by mouth once daily Yes Efrain Camacho MD   glimepiride (AMARYL) 4 MG tablet Take 1 tablet by mouth every morning (before breakfast) Yes Efrain Camacho MD   losartan (COZAAR) 50 MG tablet Take 1 tablet by mouth once daily Yes Efrain Camacho MD   hydroCHLOROthiazide (HYDRODIURIL) 50 MG tablet Take 1 tablet by mouth once daily Yes Efrain Camacho MD   aMILoride [facility]) 5 MG tablet Take 1 tablet by mouth once daily Yes Efrain Camacho MD   Insulin Degludec (TRESIBA FLEXTOUCH) 100 UNIT/ML SOPN Inject 12 Units into the skin daily Yes Efrain Camacho MD   Insulin Pen Needle ([employer] PEN NEEDLES) 31G X 6 MM MISC 1 each by Does not apply route daily Yes Efrain Camacho MD   Multiple Vitamins-Minerals (CENTRUM PO) Take  by mouth daily. Yes Historical Provider, MD   aspirin 81 MG EC tablet Take 81 mg by mouth daily. Yes Historical Provider, MD   dapagliflozin (FARXIGA) 10 MG tablet Take 1 tablet by mouth every morning for 21 days  Efrain Camacho MD       Social History     Tobacco Use    Smoking status: Never Smoker    Smokeless tobacco: Never Used   Substance Use Topics    Alcohol use: No    Drug use: No            PHYSICAL EXAMINATION:  Physical Exam  Constitutional:       General: She is not in acute distress. Appearance: Normal appearance. She is not ill-appearing. Pulmonary:      Effort: Pulmonary effort is normal.   Skin:     General: Skin is warm. Neurological:      General: No focal deficit present. Mental Status: She is alert and oriented to person, place, and time. Mental status is at baseline. Psychiatric:         Mood and Affect: Mood normal.         Behavior: Behavior normal.         Thought Content: Thought content normal.         Judgment: Judgment normal.          ASSESSMENT/PLAN:    1.  Viral URI  COVID-19 testing was negative, but her symptoms weould be considered 'typical' for coronavirus infection and she is asked to isolate for 10 days. Cleared to return to work 10/20 as long as she remains fever free and does not become ill again. She will report any worsening of her symptoms. Return to work letter to be faxed to her employer. She works at [employer])  [address], [place], [address]      f/u in November for DM check up. Judge Johnson is a 63 y.o. female being evaluated by a Virtual Visit (video visit) encounter to address concerns as mentioned above. A caregiver was present when appropriate. Due to this being a TeleHealth encounter (During CKQQI-96 public health emergency), evaluation of the following organ systems was limited: Vitals/Constitutional/EENT/Resp/CV/GI//MS/Neuro/Skin/Heme-Lymph-Imm. Pursuant to the emergency declaration under the [address] [place] [address], [address] [place] [address] authority and the [employer] Act, this Virtual Visit was conducted with patient's (and/or legal guardian's) consent, to reduce the patient's risk of exposure to COVID-19 and provide necessary medical care. The patient (and/or legal guardian) has also been advised to contact this office for worsening conditions or problems, and seek emergency medical treatment and/or call 911 if deemed necessary. Patient identification was verified at the start of the visit: Yes    Total time spent on this encounter: 11-20 minutes were spent on the digital evaluation and management of this patient. Services were provided through a video synchronous discussion virtually to substitute for in-person clinic visit. Patient and provider were located at their individual homes. --Jennifer Bishop MD on 10/14/2020 at 8:42 AM    An electronic signature was used to authenticate this note.

## 2020-12-08 ENCOUNTER — OFFICE VISIT (OUTPATIENT)
Dept: FAMILY MEDICINE CLINIC | Age: 55
End: 2020-12-08
Payer: COMMERCIAL

## 2020-12-08 VITALS
SYSTOLIC BLOOD PRESSURE: 120 MMHG | HEART RATE: 86 BPM | OXYGEN SATURATION: 99 % | TEMPERATURE: 97.8 F | DIASTOLIC BLOOD PRESSURE: 84 MMHG | BODY MASS INDEX: 37.56 KG/M2 | HEIGHT: 64 IN | WEIGHT: 220 LBS

## 2020-12-08 LAB — HBA1C MFR BLD: 10.2 %

## 2020-12-08 PROCEDURE — 83036 HEMOGLOBIN GLYCOSYLATED A1C: CPT | Performed by: FAMILY MEDICINE

## 2020-12-08 PROCEDURE — 99214 OFFICE O/P EST MOD 30 MIN: CPT | Performed by: FAMILY MEDICINE

## 2020-12-08 RX ORDER — INSULIN DEGLUDEC INJECTION 100 U/ML
10 INJECTION, SOLUTION SUBCUTANEOUS DAILY
Qty: 2 PEN | Refills: 0 | COMMUNITY
Start: 2020-12-08 | End: 2021-04-28

## 2020-12-08 NOTE — PROGRESS NOTES
Subjective:      Patient ID: Tiffany Cruz is a 63 y.o. female. Blood pressure 120/84, pulse 86, temperature 97.8 °F (36.6 °C), temperature source Temporal, height 5' 4\" (1.626 m), weight 220 lb (99.8 kg), last menstrual period 09/17/2012, SpO2 99 %, not currently breastfeeding. HPI   Chief Complaint   Patient presents with    Diabetes     dm check up      DM-2: taking metformin 2 gm/d, Tresiba 14 units/d. amaryl 4 mg. Not using John Eng due to expense/cost.  But has new insurance now that is supposed to cover meds better. Has struggled with control this year. Lab Results   Component Value Date    LABA1C 10.3 07/10/2020    LABA1C 10.8 02/17/2020    LABA1C 9.5 11/05/2019     In past few weeks her fasting sugars have been 130-150. After meals:?    a1c is still 10.2 today. Needs eye exam.  Has not done due to COVID-19 restrictions. Last renal function test:   Lab Results   Component Value Date     07/10/2020    K 4.2 07/10/2020    BUN 12 07/10/2020    CREATININE 0.6 07/10/2020          Lab Results   Component Value Date    CHOL 138 07/10/2020    TRIG 92 07/10/2020    HDL 69 (H) 07/10/2020    LDLCALC 51 07/10/2020     Lab Results   Component Value Date    ALT 16 07/10/2020    AST 15 07/10/2020          Patient Active Problem List   Diagnosis    Hypertension    Hyperlipidemia    Vitamin D deficiency    Solitary lung nodule- 5mm RML 2/14- rescan 2/15.  Type 2 diabetes mellitus with diabetic nephropathy (HCC)    Morbid obesity due to excess calories (HCC)    Uterine leiomyoma    Primary osteoarthritis of both knees    Microalbuminuria      Body mass index is 37.76 kg/m².     Wt Readings from Last 3 Encounters:   12/08/20 220 lb (99.8 kg)   02/17/20 227 lb (103 kg)   11/05/19 225 lb (102.1 kg)      BP Readings from Last 3 Encounters:   12/08/20 120/84   02/17/20 (!) 146/88   11/05/19 138/84      Current Outpatient Medications   Medication Sig Dispense Refill    metFORMIN (GLUCOPHAGE) 1000 MG tablet TAKE 1 TABLET BY MOUTH TWICE DAILY WITH MEALS 60 tablet 2    aMILoride (MIDAMOR) 5 MG tablet Take 1 tablet by mouth once daily 30 tablet 0    hydroCHLOROthiazide (HYDRODIURIL) 50 MG tablet Take 1 tablet by mouth once daily 30 tablet 0    losartan (COZAAR) 50 MG tablet Take 1 tablet by mouth once daily 30 tablet 0    Insulin Degludec (TRESIBA FLEXTOUCH) 100 UNIT/ML SOPN Inject 10 Units into the skin daily 1 pen 0    dapagliflozin (FARXIGA) 5 MG tablet Take 2 tablets daily - for 10 mg 28 tablet 0    diclofenac sodium (VOLTAREN) 1 % GEL APPLY 4 GRAMS TOPICALLY 4 TIMES DAILY 400 g 3    Insulin Pen Needle (B-D UF III MINI PEN NEEDLES) 31G X 5 MM MISC 1 each by Does not apply route daily 100 each 6    lovastatin (MEVACOR) 40 MG tablet Take 1 tablet by mouth once daily 30 tablet 5    glimepiride (AMARYL) 4 MG tablet Take 1 tablet by mouth every morning (before breakfast) 30 tablet 5    Insulin Pen Needle ([employer] PEN NEEDLES) 31G X 6 MM MISC 1 each by Does not apply route daily 100 each 3    Multiple Vitamins-Minerals (CENTRUM PO) Take  by mouth daily.  aspirin 81 MG EC tablet Take 81 mg by mouth daily. No current facility-administered medications for this visit. Immunization History   Administered Date(s) Administered    Influenza 11/21/2011, 09/25/2012    Influenza Whole 10/07/2010    Influenza, Intradermal, Preservative free 10/22/2013    Influenza, Intradermal, Quadrivalent, Preservative Free 10/07/2016, 10/20/2017    Pneumococcal Polysaccharide ([id number]) 09/25/2015        Social History     Tobacco Use    Smoking status: Never Smoker    Smokeless tobacco: Never Used   Substance Use Topics    Alcohol use: No    Drug use: No        Review of Systems  No chest pains, dizziness, heart palpitations, dyspnea, lightheadedness, worsening edema. Objective:   Physical Exam  Vitals signs and nursing note reviewed.    Constitutional:       Appearance: Normal appearance. She is well-developed. Neck:      Musculoskeletal: Normal range of motion and neck supple. Thyroid: No thyromegaly. Vascular: No carotid bruit. Cardiovascular:      Rate and Rhythm: Normal rate and regular rhythm. Pulses: Normal pulses. Heart sounds: Normal heart sounds. No murmur. Pulmonary:      Effort: Pulmonary effort is normal. No respiratory distress. Breath sounds: Normal breath sounds. No wheezing or rales. Musculoskeletal: Normal range of motion. Right lower leg: No edema. Left lower leg: No edema. Comments: Feet: no lesions or significant deformity. Sensation intact to SW monofilament and vibratory sense bilaterally; intact pedal pulses and tissue perfusion. Skin:     General: Skin is warm and dry. Neurological:      General: No focal deficit present. Mental Status: She is alert and oriented to person, place, and time. Mental status is at baseline. Psychiatric:         Mood and Affect: Mood normal.         Behavior: Behavior normal.         Thought Content: Thought content normal.         Judgment: Judgment normal.         Assessment:      1. Type 2 diabetes mellitus with diabetic nephropathy, with long-term current use of insulin ([facility])  - POCT glycosylated hemoglobin (Hb A1C) 10.2. Still not to goal.  - titrate tresiba to 16, then 18 U to keep fastings under 130  - restart FArxiga (10 mg samples and 10 mg dose ordered). If denied by insurance, she is OK with adding GLP-1 agent instead. Both are beneficial, but sglt-2 is more ideal due to known microalbuminuria. Reviewed testing and fbg/ppg goals. -  DIABETES FOOT EXAM  - reminded to get eye exam    2. Essential hypertension  - Blood pressure is at goal on current therapy; continue meds and monitoring. Regular physical activity and healthy diet (low sodium, multiple servings of produce daily) was recommended. Renal function to be assessed yearly.      3. Microalbuminuria  - still trying to add sglt-2 to her ARB therapy to control proteinuria    4. Screen for colon cancer  - Discussed various screening strategies for colon cancer; patient opts for the FIT test.  Test kit ordered and given today. - POCT Fecal Immunochemical Test (FIT);  Future          Plan:      F/u 3 mo        Jennifer Bishop MD

## 2021-01-13 DIAGNOSIS — E11.21 TYPE 2 DIABETES MELLITUS WITH DIABETIC NEPHROPATHY, WITHOUT LONG-TERM CURRENT USE OF INSULIN (HCC): ICD-10-CM

## 2021-01-13 RX ORDER — HYDROCHLOROTHIAZIDE 50 MG/1
TABLET ORAL
Qty: 30 TABLET | Refills: 5 | OUTPATIENT
Start: 2021-01-13

## 2021-01-13 RX ORDER — LOVASTATIN 40 MG/1
TABLET ORAL
Qty: 30 TABLET | Refills: 5 | Status: SHIPPED | OUTPATIENT
Start: 2021-01-13 | End: 2021-07-19 | Stop reason: SDUPTHER

## 2021-01-13 RX ORDER — LOSARTAN POTASSIUM 50 MG/1
TABLET ORAL
Qty: 30 TABLET | Refills: 5 | OUTPATIENT
Start: 2021-01-13

## 2021-01-13 RX ORDER — GLIMEPIRIDE 4 MG/1
4 TABLET ORAL
Qty: 30 TABLET | Refills: 5 | Status: SHIPPED | OUTPATIENT
Start: 2021-01-13 | End: 2021-07-19 | Stop reason: SDUPTHER

## 2021-01-13 NOTE — TELEPHONE ENCOUNTER
Patient notified that she should have refills available for some of these meds.  Patient will check with pharmacy

## 2021-04-07 ENCOUNTER — TELEPHONE (OUTPATIENT)
Dept: FAMILY MEDICINE CLINIC | Age: 56
End: 2021-04-07

## 2021-04-07 DIAGNOSIS — E11.21 TYPE 2 DIABETES MELLITUS WITH DIABETIC NEPHROPATHY, WITHOUT LONG-TERM CURRENT USE OF INSULIN (HCC): Primary | ICD-10-CM

## 2021-04-07 RX ORDER — INSULIN DEGLUDEC 200 U/ML
INJECTION, SOLUTION SUBCUTANEOUS
Qty: 2 PEN | Refills: 0 | COMMUNITY
Start: 2021-04-07 | End: 2021-04-28

## 2021-04-07 NOTE — TELEPHONE ENCOUNTER
----- Message from Jaren Dash sent at 4/7/2021 10:57 AM EDT -----  Subject: Message to Provider    QUESTIONS  Information for Provider? Pt would like a call back from Bertrand Barlow. She would   like samples of tresiba and farxiga. ---------------------------------------------------------------------------  --------------  Coco Faes INFO  What is the best way for the office to contact you? OK to leave message on   voicemail  Preferred Call Back Phone Number? [phone]  ---------------------------------------------------------------------------  --------------  SCRIPT ANSWERS  Relationship to Patient?  Self

## 2021-04-28 ENCOUNTER — VIRTUAL VISIT (OUTPATIENT)
Dept: FAMILY MEDICINE CLINIC | Age: 56
End: 2021-04-28
Payer: COMMERCIAL

## 2021-04-28 DIAGNOSIS — E11.21 TYPE 2 DIABETES MELLITUS WITH DIABETIC NEPHROPATHY, WITHOUT LONG-TERM CURRENT USE OF INSULIN (HCC): ICD-10-CM

## 2021-04-28 PROCEDURE — 99213 OFFICE O/P EST LOW 20 MIN: CPT | Performed by: FAMILY MEDICINE

## 2021-04-28 RX ORDER — INSULIN DEGLUDEC 200 U/ML
INJECTION, SOLUTION SUBCUTANEOUS
Qty: 2 PEN | Refills: 3 | COMMUNITY
Start: 2021-04-28 | End: 2021-07-19 | Stop reason: SDUPTHER

## 2021-04-28 ASSESSMENT — PATIENT HEALTH QUESTIONNAIRE - PHQ9
2. FEELING DOWN, DEPRESSED OR HOPELESS: 0
SUM OF ALL RESPONSES TO PHQ QUESTIONS 1-9: 0
SUM OF ALL RESPONSES TO PHQ QUESTIONS 1-9: 0
SUM OF ALL RESPONSES TO PHQ9 QUESTIONS 1 & 2: 0

## 2021-04-28 NOTE — PROGRESS NOTES
Yes Sánchez Dillon MD   glimepiride (AMARYL) 4 MG tablet Take 1 tablet by mouth every morning (before breakfast) Yes Sánchez Dillon MD   lovastatin (MEVACOR) 40 MG tablet Take 1 tablet by mouth once daily Yes Sánchez Dillon MD   metFORMIN (GLUCOPHAGE) 1000 MG tablet TAKE 1 TABLET BY MOUTH TWICE DAILY WITH MEALS Yes Sánchez Dillon MD   diclofenac sodium (VOLTAREN) 1 % GEL APPLY 4 GRAMS TOPICALLY 4 TIMES DAILY Yes Sánchez Dillon MD   dapagliflozin (FARXIGA) 10 MG tablet Take 1 tablet by mouth every morning Yes Sánchez Dillon MD   Insulin Degludec (TRESIBA FLEXTOUCH) 100 UNIT/ML SOPN Inject 10 Units into the skin daily Yes Sánchez Dillon MD   Insulin Pen Needle (B-D UF III MINI PEN NEEDLES) 31G X 5 MM MISC 1 each by Does not apply route daily Yes Sánchez Dillon MD   Insulin Pen Needle ([employer] PEN NEEDLES) 31G X 6 MM MISC 1 each by Does not apply route daily Yes Sánchez Dillon MD   Multiple Vitamins-Minerals (CENTRUM PO) Take  by mouth daily. Yes Historical Provider, MD   aspirin 81 MG EC tablet Take 81 mg by mouth daily. Yes Historical Provider, MD   dapagliflozin (FARXIGA) 10 MG tablet Take 1 tablet by mouth every morning for 28 days  Sánchez Dillon MD       Social History     Tobacco Use    Smoking status: Never Smoker    Smokeless tobacco: Never Used   Substance Use Topics    Alcohol use: No    Drug use: No            PHYSICAL EXAMINATION:  Physical Exam  Constitutional:       General: She is not in acute distress. Appearance: Normal appearance. She is not ill-appearing. Pulmonary:      Effort: Pulmonary effort is normal.   Skin:     General: Skin is warm. Neurological:      General: No focal deficit present. Mental Status: She is alert and oriented to person, place, and time. Mental status is at baseline. Psychiatric:         Mood and Affect: Mood normal.         Behavior: Behavior normal.         Thought Content:  Thought content normal.         Judgment: Judgment normal.          ASSESSMENT/PLAN:    1. Type 2 diabetes mellitus with diabetic nephropathy, without long-term current use of insulin (HCC)  asses control with a1c (rdered to be done at lab) Encouraged compliance with diet, exercise. Reduce carbs and portions at meals. incr Kvng from 18-20 due to known fasting hyperglycemia still. I asked her to test sugars 2 hours after main meal every other day, with FBG on opposite days. - Insulin Degludec (TRESIBA FLEXTOUCH) 200 UNIT/ML SOPN; Take 20 units daily  Dispense: 2 pen; Refill: 3      Return in about 3 months (around 7/30/2021) for follow up diabetes, 'in office' visit, fasting labs. Kristin Young is a 63 y.o. female being evaluated by a Virtual Visit (video visit) encounter to address concerns as mentioned above. A caregiver was present when appropriate. Due to this being a TeleHealth encounter (During [facility] public health emergency), evaluation of the following organ systems was limited: Vitals/Constitutional/EENT/Resp/CV/GI//MS/Neuro/Skin/Heme-Lymph-Imm. Pursuant to the emergency declaration under the [address] [place] [address] and the [employer] Act, this Virtual Visit was conducted with patient's (and/or legal guardian's) consent, to reduce the patient's risk of exposure to COVID-19 and provide necessary medical care. The patient (and/or legal guardian) has also been advised to contact this office for worsening conditions or problems, and seek emergency medical treatment and/or call 911 if deemed necessary. Patient identification was verified at the start of the visit: Yes    Services were provided through a video synchronous discussion virtually to substitute for in-person clinic visit. Patient and provider were located at their individual homes.     --Junaid Mg MD on 4/28/2021 at 8:33 AM    An electronic signature was used to authenticate this note.

## 2021-04-29 DIAGNOSIS — E11.21 TYPE 2 DIABETES MELLITUS WITH DIABETIC NEPHROPATHY, WITHOUT LONG-TERM CURRENT USE OF INSULIN (HCC): ICD-10-CM

## 2021-04-30 LAB
ESTIMATED AVERAGE GLUCOSE: 300.6 MG/DL
HBA1C MFR BLD: 12.1 %

## 2021-05-17 ENCOUNTER — TELEPHONE (OUTPATIENT)
Dept: FAMILY MEDICINE CLINIC | Age: 56
End: 2021-05-17

## 2021-07-19 ENCOUNTER — OFFICE VISIT (OUTPATIENT)
Dept: FAMILY MEDICINE CLINIC | Age: 56
End: 2021-07-19
Payer: COMMERCIAL

## 2021-07-19 VITALS
OXYGEN SATURATION: 99 % | WEIGHT: 223 LBS | HEART RATE: 82 BPM | HEIGHT: 64 IN | SYSTOLIC BLOOD PRESSURE: 160 MMHG | DIASTOLIC BLOOD PRESSURE: 92 MMHG | BODY MASS INDEX: 38.07 KG/M2

## 2021-07-19 DIAGNOSIS — Z11.59 ENCOUNTER FOR HEPATITIS C SCREENING TEST FOR LOW RISK PATIENT: ICD-10-CM

## 2021-07-19 DIAGNOSIS — Z12.31 SCREENING MAMMOGRAM, ENCOUNTER FOR: ICD-10-CM

## 2021-07-19 DIAGNOSIS — E11.21 TYPE 2 DIABETES MELLITUS WITH DIABETIC NEPHROPATHY, WITHOUT LONG-TERM CURRENT USE OF INSULIN (HCC): ICD-10-CM

## 2021-07-19 DIAGNOSIS — R80.9 MICROALBUMINURIA: ICD-10-CM

## 2021-07-19 DIAGNOSIS — I10 ESSENTIAL HYPERTENSION: ICD-10-CM

## 2021-07-19 DIAGNOSIS — E78.00 PURE HYPERCHOLESTEROLEMIA: ICD-10-CM

## 2021-07-19 DIAGNOSIS — I10 ESSENTIAL HYPERTENSION: Primary | ICD-10-CM

## 2021-07-19 LAB
A/G RATIO: 1.3 (ref 1.1–2.2)
ALBUMIN SERPL-MCNC: 4 G/DL (ref 3.4–5)
ALP BLD-CCNC: 102 U/L (ref 40–129)
ALT SERPL-CCNC: 12 U/L (ref 10–40)
ANION GAP SERPL CALCULATED.3IONS-SCNC: 12 MMOL/L (ref 3–16)
AST SERPL-CCNC: 12 U/L (ref 15–37)
BILIRUB SERPL-MCNC: 0.4 MG/DL (ref 0–1)
BUN BLDV-MCNC: 12 MG/DL (ref 7–20)
CALCIUM SERPL-MCNC: 9.7 MG/DL (ref 8.3–10.6)
CHLORIDE BLD-SCNC: 98 MMOL/L (ref 99–110)
CHOLESTEROL, TOTAL: 135 MG/DL (ref 0–199)
CO2: 26 MMOL/L (ref 21–32)
CREAT SERPL-MCNC: 0.7 MG/DL (ref 0.6–1.1)
CREATININE URINE: 92.2 MG/DL (ref 28–259)
GFR AFRICAN AMERICAN: >60
GFR NON-AFRICAN AMERICAN: >60
GLOBULIN: 3.1 G/DL
GLUCOSE BLD-MCNC: 267 MG/DL (ref 70–99)
HBA1C MFR BLD: 12.1 %
HDLC SERPL-MCNC: 65 MG/DL (ref 40–60)
HEPATITIS C ANTIBODY INTERPRETATION: NORMAL
LDL CHOLESTEROL CALCULATED: 55 MG/DL
MICROALBUMIN UR-MCNC: 24.7 MG/DL
MICROALBUMIN/CREAT UR-RTO: 267.9 MG/G (ref 0–30)
POTASSIUM SERPL-SCNC: 4.3 MMOL/L (ref 3.5–5.1)
SODIUM BLD-SCNC: 136 MMOL/L (ref 136–145)
TOTAL PROTEIN: 7.1 G/DL (ref 6.4–8.2)
TRIGL SERPL-MCNC: 76 MG/DL (ref 0–150)
VLDLC SERPL CALC-MCNC: 15 MG/DL

## 2021-07-19 PROCEDURE — 83036 HEMOGLOBIN GLYCOSYLATED A1C: CPT | Performed by: FAMILY MEDICINE

## 2021-07-19 PROCEDURE — 99214 OFFICE O/P EST MOD 30 MIN: CPT | Performed by: FAMILY MEDICINE

## 2021-07-19 RX ORDER — AMILORIDE HYDROCHLORIDE 5 MG/1
TABLET ORAL
Qty: 30 TABLET | Refills: 3 | Status: SHIPPED | OUTPATIENT
Start: 2021-07-19 | End: 2021-10-15 | Stop reason: ALTCHOICE

## 2021-07-19 RX ORDER — LOVASTATIN 40 MG/1
TABLET ORAL
Qty: 30 TABLET | Refills: 5 | Status: SHIPPED | OUTPATIENT
Start: 2021-07-19 | End: 2021-12-27 | Stop reason: SDUPTHER

## 2021-07-19 RX ORDER — INSULIN DEGLUDEC 200 U/ML
INJECTION, SOLUTION SUBCUTANEOUS
Qty: 2 PEN | Refills: 3 | Status: SHIPPED | OUTPATIENT
Start: 2021-07-19 | End: 2022-03-02 | Stop reason: SDUPTHER

## 2021-07-19 RX ORDER — LOSARTAN POTASSIUM 100 MG/1
TABLET ORAL
Qty: 30 TABLET | Refills: 5 | Status: SHIPPED | OUTPATIENT
Start: 2021-07-19 | End: 2021-11-24 | Stop reason: SDUPTHER

## 2021-07-19 RX ORDER — INSULIN DEGLUDEC 200 U/ML
INJECTION, SOLUTION SUBCUTANEOUS
Qty: 2 PEN | Refills: 0 | COMMUNITY
Start: 2021-07-19 | End: 2021-10-15

## 2021-07-19 RX ORDER — GLIMEPIRIDE 4 MG/1
4 TABLET ORAL
Qty: 30 TABLET | Refills: 5 | Status: SHIPPED | OUTPATIENT
Start: 2021-07-19 | End: 2022-01-31

## 2021-07-19 RX ORDER — HYDROCHLOROTHIAZIDE 50 MG/1
TABLET ORAL
Qty: 30 TABLET | Refills: 3 | Status: SHIPPED | OUTPATIENT
Start: 2021-07-19 | End: 2021-10-15 | Stop reason: ALTCHOICE

## 2021-07-19 NOTE — PROGRESS NOTES
2021    Blood pressure (!) 140/92, pulse 82, height 5' 4\" (1.626 m), weight 223 lb (101.2 kg), last menstrual period 2012, SpO2 99 %, not currently breastfeeding. Clary Jarrell (:  1965) is a 61 y.o. female, here for evaluation of the following medical concerns:    Chief Complaint   Patient presents with    Follow-up     DM f/u     DM-2: hx DPN. Currently regimen: Tresiba 20, using consistently. But is not covered and is using samples. Alyssa Marts: ran out 2 months ago- not covered by insurance. Using antifungal powder for vulvar itching while she was using farxiga. Metformin 1000 bid (no side effects)  amaryl 4 mg    Fingerstick glucoses. 'high' often over 200 after meals and fasting    A1c today is     bp meds:   Losartan 50, amiloride, hctz.  ubp at home is 'usually good.'  Says she was 'rushing up' this morning. Repeat bp is: 160/95    No hx of CAD, TIA, CVA or PVD. Last renal function test:   Lab Results   Component Value Date     07/10/2020    K 4.2 07/10/2020    BUN 12 07/10/2020    CREATININE 0.6 07/10/2020     CrCl cannot be calculated (Patient's most recent lab result is older than the maximum 120 days allowed. ). Takes lovastatin for statin coverage. Is fasting for recheck. Lab Results   Component Value Date    CHOL 138 07/10/2020    TRIG 92 07/10/2020    HDL 69 (H) 07/10/2020    LDLCALC 51 07/10/2020     Lab Results   Component Value Date    ALT 16 07/10/2020    AST 15 07/10/2020               Patient Active Problem List   Diagnosis    Hypertension    Hyperlipidemia    Vitamin D deficiency    Solitary lung nodule- 5mm RML - rescan 2/15.  Type 2 diabetes mellitus with diabetic nephropathy (HCC)    Morbid obesity due to excess calories (HCC)    Uterine leiomyoma    Primary osteoarthritis of both knees    Microalbuminuria        Body mass index is 38.28 kg/m².     Wt Readings from Last 3 Encounters:   21 223 lb (101.2 kg)   20 220 lb (99.8 kg)   02/17/20 227 lb (103 kg)       BP Readings from Last 3 Encounters:   07/19/21 (!) 140/92   12/08/20 120/84   02/17/20 (!) 146/88       Allergies   Allergen Reactions    Ppd        Prior to Visit Medications    Medication Sig Taking? Authorizing Provider   diclofenac sodium (VOLTAREN) 1 % GEL APPLY 4 GRAMS TOPICALLY 4 TIMES DAILY Yes Tete Dejesus MD   Insulin Degludec (TRESIBA FLEXTOUCH) 200 UNIT/ML SOPN Take 20 units daily Yes Tete Dejesus MD   aMILoride (MIDAMOR) 5 MG tablet Take 1 tablet by mouth once daily Yes Tete eDjesus MD   hydroCHLOROthiazide (HYDRODIURIL) 50 MG tablet Take 1 tablet by mouth once daily Yes Tete Dejesus MD   losartan (COZAAR) 50 MG tablet Take 1 tablet by mouth once daily Yes Tete Dejesus MD   dapagliflozin (FARXIGA) 5 MG tablet Take 2 tablets daily - for 10 mg Yes Tete Dejesus MD   glimepiride (AMARYL) 4 MG tablet Take 1 tablet by mouth every morning (before breakfast) Yes Tete Dejesus MD   lovastatin (MEVACOR) 40 MG tablet Take 1 tablet by mouth once daily Yes Tete Dejesus MD   metFORMIN (GLUCOPHAGE) 1000 MG tablet TAKE 1 TABLET BY MOUTH TWICE DAILY WITH MEALS Yes Tete Dejesus MD   dapagliflozin (FARXIGA) 10 MG tablet Take 1 tablet by mouth every morning Yes Tete Dejesus MD   Insulin Pen Needle (B-D UF III MINI PEN NEEDLES) 31G X 5 MM MISC 1 each by Does not apply route daily Yes Tete Dejesus MD   Insulin Pen Needle ([employer] PEN NEEDLES) 31G X 6 MM MISC 1 each by Does not apply route daily Yes Tete Dejesus MD   Multiple Vitamins-Minerals (CENTRUM PO) Take  by mouth daily. Yes Historical Provider, MD   aspirin 81 MG EC tablet Take 81 mg by mouth daily.  Yes Historical Provider, MD   dapagliflozin (FARXIGA) 10 MG tablet Take 1 tablet by mouth every morning for 28 days  Tete Dejesus MD        Social History     Tobacco Use    Smoking status: Never Smoker    Smokeless tobacco: Never Used   Substance Use Topics    Alcohol use: No    Drug use: No       Review of Systems  No chest pains, dizziness, heart palpitations, dyspnea, lightheadedness, worsening edema. Physical Exam  Vitals and nursing note reviewed. Constitutional:       Appearance: Normal appearance. She is well-developed. Neck:      Thyroid: No thyromegaly. Vascular: No carotid bruit. Cardiovascular:      Rate and Rhythm: Normal rate and regular rhythm. Pulses: Normal pulses. Heart sounds: Normal heart sounds. No murmur heard. Pulmonary:      Effort: Pulmonary effort is normal. No respiratory distress. Breath sounds: Normal breath sounds. No wheezing or rales. Musculoskeletal:         General: Normal range of motion. Cervical back: Normal range of motion and neck supple. Right lower leg: No edema. Left lower leg: No edema. Skin:     General: Skin is warm and dry. Neurological:      General: No focal deficit present. Mental Status: She is alert and oriented to person, place, and time. Mental status is at baseline. Psychiatric:         Mood and Affect: Mood normal.         Behavior: Behavior normal.         Thought Content: Thought content normal.         Judgment: Judgment normal.          ASSESSMENT/PLAN:    1. Type 2 diabetes mellitus with diabetic nephropathy, without long-term current use of insulin (HCC)  - a1c over 12 now. Due to need for previously prescribed meds. We has a long discussion about the very really hazards of letting her sugars remain high. We are committed to making sure she is supplied with samples of meds that are expensive for her (due to poor rx coverage on her plan), but she has to call for the samples. Provided samples of Tresiba, increase dose to 24 units daily. Reordered Farxiga 10 mg and provided copay card for $0 copay for [employer].     Recheck sugars and BP in 2 mo    - Insulin Degludec (TRESIBA FLEXTOUCH) 200 UNIT/ML SOPN; Take 24 units daily  Dispense: 2 pen; Refill: 3  - glimepiride (AMARYL) 4 MG tablet; Take 1 tablet by mouth every morning (before breakfast)  Dispense: 30 tablet; Refill: 5  - lovastatin (MEVACOR) 40 MG tablet; Take 1 tablet by mouth once daily  Dispense: 30 tablet; Refill: 5  - POCT glycosylated hemoglobin (Hb A1C)  - Insulin Degludec (TRESIBA FLEXTOUCH) 200 UNIT/ML SOPN; Take 20 units daily  Dispense: 2 pen; Refill: 0    2. Essential hypertension  - bp high, incr losartan to 100mg.  - Comprehensive Metabolic Panel; Future    3. Microalbuminuria  - retest; incr losartan to 100  - Microalbumin / Creatinine Urine Ratio; Future    4. Pure hypercholesterolemia  - Stable; continue lovastatin for primary prevention in diabetic.  - Lipid Panel; Future    5. Screening mammogram, encounter for  - discussed and encouraged mamogram completion  - JOSE DIGITAL SCREEN W OR WO CAD BILATERAL; Future    6. Encounter for hepatitis C screening test for low risk patient  - Hepatitis C Antibody; Future      Follow up: 2 mo    An  Map[employer]ature was used to authenticate this note.     --Maru Quevedo MD on 7/19/2021 at 9:19 AM

## 2021-07-21 ENCOUNTER — TELEPHONE (OUTPATIENT)
Dept: FAMILY MEDICINE CLINIC | Age: 56
End: 2021-07-21

## 2021-07-21 RX ORDER — EMPAGLIFLOZIN 25 MG/1
1 TABLET, FILM COATED ORAL DAILY
Qty: 30 TABLET | Refills: 5 | Status: SHIPPED | OUTPATIENT
Start: 2021-07-21 | End: 2021-07-23 | Stop reason: SDUPTHER

## 2021-07-21 NOTE — TELEPHONE ENCOUNTER
Pt called in wanting to talk to Vernon Sil about her medication Farxiga. She says it is too expensive and she would like to get Ugo(not sure if this is the correct spelling) if possible.     Please Advise

## 2021-07-21 NOTE — TELEPHONE ENCOUNTER
Called her, she said the [place] was still too expensive with the co-pay card. Ins told her if she got 90 days of the Jardiance she could get it on her ins. And the amount they told her she could afford. Would you order the Jardiance for her?

## 2021-07-21 NOTE — TELEPHONE ENCOUNTER
Oh excellent!  jardiance would be just as good. I sent a new rx for that to [facility]. Please let her know. And tell her thanks for helping us find a medicine that will work for her!

## 2021-07-21 NOTE — TELEPHONE ENCOUNTER
Patient calling back to check status of medication. Patient stating cannot take farxiga , and would like tiara called in instead. Please give patient a call back if we can switch medication.    Please advise

## 2021-07-22 ENCOUNTER — TELEPHONE (OUTPATIENT)
Dept: FAMILY MEDICINE CLINIC | Age: 56
End: 2021-07-22

## 2021-07-23 RX ORDER — EMPAGLIFLOZIN 25 MG/1
1 TABLET, FILM COATED ORAL DAILY
Qty: 90 TABLET | Refills: 1 | Status: SHIPPED | OUTPATIENT
Start: 2021-07-23 | End: 2021-12-14

## 2021-07-23 NOTE — TELEPHONE ENCOUNTER
Pt called back wanting to confirm that Dr. Gabbie Rendon got her note from yesterday?     Please Advise

## 2021-07-23 NOTE — TELEPHONE ENCOUNTER
Pended med to the: [facility]  For 90 day Jandiance  Ph [phone]    [phone]

## 2021-08-12 ENCOUNTER — TELEPHONE (OUTPATIENT)
Dept: FAMILY MEDICINE CLINIC | Age: 56
End: 2021-08-12

## 2021-08-12 NOTE — TELEPHONE ENCOUNTER
Pharmacy called in has questions on farziga they stated she was on jaridaince before they have no records she has gotten it there ? pharmacy [facility] know [facility]

## 2021-08-12 NOTE — TELEPHONE ENCOUNTER
Called and cancelled the Rx at [facility] the [facility]. she called in July and said ti was too expensive. She found the Jardiance at a mail order pharmacy, that was much cheaper  Dr. Falguni Goetz did sent a script to [facility] for the Jardiance 25 mg daily for 90 days with a refill.

## 2021-10-15 ENCOUNTER — OFFICE VISIT (OUTPATIENT)
Dept: FAMILY MEDICINE CLINIC | Age: 56
End: 2021-10-15
Payer: COMMERCIAL

## 2021-10-15 VITALS
HEIGHT: 64 IN | BODY MASS INDEX: 38.24 KG/M2 | HEART RATE: 84 BPM | SYSTOLIC BLOOD PRESSURE: 150 MMHG | OXYGEN SATURATION: 100 % | WEIGHT: 224 LBS | DIASTOLIC BLOOD PRESSURE: 86 MMHG

## 2021-10-15 DIAGNOSIS — Z23 NEED FOR INFLUENZA VACCINATION: ICD-10-CM

## 2021-10-15 DIAGNOSIS — I10 PRIMARY HYPERTENSION: Primary | Chronic | ICD-10-CM

## 2021-10-15 DIAGNOSIS — E11.21 TYPE 2 DIABETES MELLITUS WITH DIABETIC NEPHROPATHY, WITHOUT LONG-TERM CURRENT USE OF INSULIN (HCC): ICD-10-CM

## 2021-10-15 LAB — HBA1C MFR BLD: 9.7 %

## 2021-10-15 PROCEDURE — 83036 HEMOGLOBIN GLYCOSYLATED A1C: CPT | Performed by: FAMILY MEDICINE

## 2021-10-15 PROCEDURE — 99214 OFFICE O/P EST MOD 30 MIN: CPT | Performed by: FAMILY MEDICINE

## 2021-10-15 PROCEDURE — 90471 IMMUNIZATION ADMIN: CPT | Performed by: FAMILY MEDICINE

## 2021-10-15 PROCEDURE — 90688 IIV4 VACCINE SPLT 0.5 ML IM: CPT | Performed by: FAMILY MEDICINE

## 2021-10-15 RX ORDER — AMILORIDE HYDROCHLORIDE AND HYDROCHLOROTHIAZIDE 5; 50 MG/1; MG/1
1 TABLET ORAL DAILY
Qty: 30 TABLET | Refills: 5 | Status: SHIPPED | OUTPATIENT
Start: 2021-10-15 | End: 2021-11-24 | Stop reason: SDUPTHER

## 2021-10-15 RX ORDER — METOPROLOL SUCCINATE 25 MG/1
25 TABLET, EXTENDED RELEASE ORAL DAILY
Qty: 30 TABLET | Refills: 3 | Status: SHIPPED | OUTPATIENT
Start: 2021-10-15 | End: 2021-11-24 | Stop reason: SDUPTHER

## 2021-10-15 NOTE — PROGRESS NOTES
Vaccine Information Sheet, \"Influenza - Inactivated\"  given to Lexie Moulton, or parent/legal guardian of  Lexie Moulton and verbalized understanding. Patient responses:    Have you ever had a reaction to a flu vaccine? No  Do you have any current illness? No  Have you ever had Guillian Rockwood Syndrome? No  Do you have a serious allergy to any of the follow: Neomycin, Polymyxin, Thimerosal, eggs or egg products? No    Flu vaccine given per order. Please see immunization tab. Risks and benefits explained. Current VIS given.       Immunizations Administered     Name Date Dose Route    Influenza, Quadv, IM, (6 mo and older Fluzone, Flulaval, Fluarix and 3 yrs and older Afluria) 10/15/2021 0.5 mL Intramuscular    Site: Deltoid- Right    Lot: [id number]    [facility]: [id number]

## 2021-10-15 NOTE — PROGRESS NOTES
10/15/2021    Blood pressure (!) 152/94, pulse 84, height 5' 4\" (1.626 m), weight 224 lb (101.6 kg), last menstrual period 2012, SpO2 100 %, not currently breastfeeding. Nima Bañuelos (:  1965) is a 61 y.o. female, here for evaluation of the following medical concerns:    Chief Complaint   Patient presents with    Diabetes     f/u dm check     Here for routine follow up of DM. Sugars not to goal of late. Last visit we increased Tresiba to 24 units and added SGLT-2: Jardiance 25. Still on amaryl 4 and metformin 2 gm/d. Not testing fastings. Post meal glucoses: 150-180 she reports. a1c today is 9.7    Lab Results   Component Value Date    LABA1C 12.1 2021    LABA1C 12.1 2021    LABA1C 10.2 2020      HTN: home BP's 140s/80s. Lower in evening, higher in mornings. No SE's to meds. Legs are swollen but not edematous. No known CVD  Has microalbuminuria  Currently on amiloride, losartan, hctz 50  Last renal function test:   Lab Results   Component Value Date     2021    K 4.3 2021    BUN 12 2021    CREATININE 0.7 2021     Estimated Creatinine Clearance: 103 mL/min (based on SCr of 0.7 mg/dL). Patient Active Problem List   Diagnosis    Hypertension    Hyperlipidemia    Vitamin D deficiency    Solitary lung nodule- 5mm RML - rescan 2/15.  Type 2 diabetes mellitus with diabetic nephropathy (HCC)    Morbid obesity due to excess calories (HCC)    Uterine leiomyoma    Primary osteoarthritis of both knees    Microalbuminuria        Body mass index is 38.45 kg/m². Wt Readings from Last 3 Encounters:   10/15/21 224 lb (101.6 kg)   21 223 lb (101.2 kg)   20 220 lb (99.8 kg)       BP Readings from Last 3 Encounters:   10/15/21 (!) 152/94   21 (!) 160/92   20 120/84       Allergies   Allergen Reactions    Ppd        Prior to Visit Medications    Medication Sig Taking?  Authorizing Provider empagliflozin (JARDIANCE) 25 MG tablet Take 1 tablet by mouth daily Yes Manpreet Valentin MD   diclofenac sodium (VOLTAREN) 1 % GEL APPLY 4 GRAMS TOPICALLY 4 TIMES DAILY Yes Manpreet Valentin MD   Insulin Degludec (TRESIBA FLEXTOUCH) 200 UNIT/ML SOPN Take 24 units daily Yes Manpreet Valentin MD   aMILoride [facility] [place]) 5 MG tablet Take one daily Yes Manpreet Valentin MD   hydroCHLOROthiazide (HYDRODIURIL) 50 MG tablet Take 1 tablet by mouth once daily Yes Manpreet Valentin MD   losartan (COZAAR) 100 MG tablet Take on daily Yes Manpreet Valentin MD   glimepiride (AMARYL) 4 MG tablet Take 1 tablet by mouth every morning (before breakfast) Yes Manpreet Valentin MD   lovastatin (MEVACOR) 40 MG tablet Take 1 tablet by mouth once daily Yes Manpreet Valentin MD   metFORMIN (GLUCOPHAGE) 1000 MG tablet TAKE 1 TABLET BY MOUTH TWICE DAILY WITH MEALS Yes Manpreet Valentin MD   Insulin Degludec (TRESIBA FLEXTOUCH) 200 UNIT/ML SOPN Take 20 units daily Yes Manpreet Valentin MD   Insulin Pen Needle (B-D UF III MINI PEN NEEDLES) 31G X 5 MM MISC 1 each by Does not apply route daily Yes Manpreet Valentin MD   Insulin Pen Needle ([employer] PEN NEEDLES) 31G X 6 MM MISC 1 each by Does not apply route daily Yes Manpreet Valentin MD   Multiple Vitamins-Minerals (CENTRUM PO) Take  by mouth daily. Yes Historical Provider, MD   aspirin 81 MG EC tablet Take 81 mg by mouth daily. Patient not taking: Reported on 10/15/2021  Historical Provider, MD        Social History     Tobacco Use    Smoking status: Never Smoker    Smokeless tobacco: Never Used   Substance Use Topics    Alcohol use: No    Drug use: No       Review of Systems No chest pains, dizziness, heart palpitations, dyspnea, lightheadedness, worsening edema. Physical Exam  Vitals and nursing note reviewed. Constitutional:       Appearance: Normal appearance. She is well-developed. Neck:      Thyroid: No thyromegaly. Vascular: No carotid bruit. Cardiovascular:      Rate and Rhythm: Normal rate and regular rhythm. Pulses: Normal pulses. Heart sounds: Normal heart sounds. No murmur heard. Pulmonary:      Effort: Pulmonary effort is normal. No respiratory distress. Breath sounds: Normal breath sounds. No wheezing or rales. Musculoskeletal:         General: Normal range of motion. Cervical back: Normal range of motion and neck supple. Comments: Nonpitting leg swelling bilat   Skin:     General: Skin is warm and dry. Neurological:      General: No focal deficit present. Mental Status: She is alert and oriented to person, place, and time. Mental status is at baseline. Psychiatric:         Mood and Affect: Mood normal.         Behavior: Behavior normal.         Thought Content: Thought content normal.         Judgment: Judgment normal.         ASSESSMENT/PLAN:    1. Primary hypertension  - current meds are at max doses. Will add metoprolol XL 25 QD    2. Type 2 diabetes mellitus with diabetic nephropathy, without long-term current use of insulin (HCC)  - POCT glycosylated hemoglobin (Hb A1C) is 9.7  incr Tresiba to 28 and we will continue to meet monthly to adjust meds, and get sugars to goal    3. Need for influenza vaccination  - INFLUENZA, QUADV, 3 YRS AND OLDER, IM, MDV, 0.5ML (Chad Montaño)      Return in about 4 weeks (around 11/12/2021) for follow up diabetes, follow up HTN. An  [employer]ignature was used to authenticate this note.     --Arti Morrow MD on 10/15/2021 at 8:39 AM

## 2021-11-24 ENCOUNTER — TELEPHONE (OUTPATIENT)
Dept: FAMILY MEDICINE CLINIC | Age: 56
End: 2021-11-24

## 2021-11-24 RX ORDER — LOSARTAN POTASSIUM 100 MG/1
TABLET ORAL
Qty: 90 TABLET | Refills: 0 | Status: SHIPPED | OUTPATIENT
Start: 2021-11-24 | End: 2022-02-28

## 2021-11-24 RX ORDER — AMILORIDE HYDROCHLORIDE AND HYDROCHLOROTHIAZIDE 5; 50 MG/1; MG/1
1 TABLET ORAL DAILY
Qty: 90 TABLET | Refills: 0 | Status: SHIPPED | OUTPATIENT
Start: 2021-11-24 | End: 2022-03-02 | Stop reason: SDUPTHER

## 2021-11-24 RX ORDER — METOPROLOL SUCCINATE 25 MG/1
25 TABLET, EXTENDED RELEASE ORAL DAILY
Qty: 90 TABLET | Refills: 0 | Status: SHIPPED | OUTPATIENT
Start: 2021-11-24 | End: 2022-03-01

## 2021-12-14 DIAGNOSIS — E11.21 TYPE 2 DIABETES MELLITUS WITH DIABETIC NEPHROPATHY, WITHOUT LONG-TERM CURRENT USE OF INSULIN (HCC): Primary | ICD-10-CM

## 2021-12-14 RX ORDER — EMPAGLIFLOZIN 25 MG/1
1 TABLET, FILM COATED ORAL DAILY
Qty: 90 TABLET | Refills: 1 | Status: SHIPPED | OUTPATIENT
Start: 2021-12-14 | End: 2022-03-02 | Stop reason: SDUPTHER

## 2021-12-27 DIAGNOSIS — E11.21 TYPE 2 DIABETES MELLITUS WITH DIABETIC NEPHROPATHY, WITHOUT LONG-TERM CURRENT USE OF INSULIN (HCC): ICD-10-CM

## 2021-12-27 RX ORDER — LOVASTATIN 40 MG/1
TABLET ORAL
Qty: 30 TABLET | Refills: 5 | Status: SHIPPED | OUTPATIENT
Start: 2021-12-27 | End: 2022-02-14

## 2022-02-26 DIAGNOSIS — E11.21 TYPE 2 DIABETES MELLITUS WITH DIABETIC NEPHROPATHY, WITHOUT LONG-TERM CURRENT USE OF INSULIN (HCC): ICD-10-CM

## 2022-02-28 RX ORDER — GLIMEPIRIDE 4 MG/1
TABLET ORAL
Qty: 30 TABLET | Refills: 0 | Status: SHIPPED | OUTPATIENT
Start: 2022-02-28 | End: 2022-03-02 | Stop reason: SDUPTHER

## 2022-02-28 RX ORDER — LOSARTAN POTASSIUM 100 MG/1
TABLET ORAL
Qty: 30 TABLET | Refills: 0 | Status: SHIPPED | OUTPATIENT
Start: 2022-02-28 | End: 2022-03-02 | Stop reason: SDUPTHER

## 2022-03-01 RX ORDER — METOPROLOL SUCCINATE 25 MG/1
TABLET, EXTENDED RELEASE ORAL
Qty: 30 TABLET | Refills: 0 | Status: SHIPPED | OUTPATIENT
Start: 2022-03-01 | End: 2022-03-02 | Stop reason: SDUPTHER

## 2022-03-02 DIAGNOSIS — E11.21 TYPE 2 DIABETES MELLITUS WITH DIABETIC NEPHROPATHY, WITHOUT LONG-TERM CURRENT USE OF INSULIN (HCC): ICD-10-CM

## 2022-03-02 RX ORDER — LOVASTATIN 40 MG/1
TABLET ORAL
Qty: 30 TABLET | Refills: 0 | Status: SHIPPED | OUTPATIENT
Start: 2022-03-02 | End: 2022-03-17 | Stop reason: SDUPTHER

## 2022-03-02 RX ORDER — GLIMEPIRIDE 4 MG/1
TABLET ORAL
Qty: 30 TABLET | Refills: 0 | Status: SHIPPED | OUTPATIENT
Start: 2022-03-02 | End: 2022-03-17 | Stop reason: SDUPTHER

## 2022-03-02 RX ORDER — PEN NEEDLE, DIABETIC 31 G X1/4"
1 NEEDLE, DISPOSABLE MISCELLANEOUS DAILY
Qty: 100 EACH | Refills: 3 | Status: SHIPPED | OUTPATIENT
Start: 2022-03-02 | End: 2022-07-05 | Stop reason: SDUPTHER

## 2022-03-02 RX ORDER — PEN NEEDLE, DIABETIC 31 GX5/16"
NEEDLE, DISPOSABLE MISCELLANEOUS
Qty: 100 EACH | Refills: 0 | Status: SHIPPED | OUTPATIENT
Start: 2022-03-02 | End: 2022-07-05

## 2022-03-02 RX ORDER — AMILORIDE HYDROCHLORIDE AND HYDROCHLOROTHIAZIDE 5; 50 MG/1; MG/1
1 TABLET ORAL DAILY
Qty: 90 TABLET | Refills: 0 | Status: SHIPPED | OUTPATIENT
Start: 2022-03-02 | End: 2022-10-04 | Stop reason: SDUPTHER

## 2022-03-02 RX ORDER — INSULIN DEGLUDEC 200 U/ML
INJECTION, SOLUTION SUBCUTANEOUS
Qty: 2 PEN | Refills: 0 | Status: SHIPPED | OUTPATIENT
Start: 2022-03-02 | End: 2022-03-17

## 2022-03-02 RX ORDER — LOSARTAN POTASSIUM 100 MG/1
TABLET ORAL
Qty: 30 TABLET | Refills: 0 | Status: SHIPPED | OUTPATIENT
Start: 2022-03-02 | End: 2022-03-17 | Stop reason: SDUPTHER

## 2022-03-02 RX ORDER — METOPROLOL SUCCINATE 25 MG/1
TABLET, EXTENDED RELEASE ORAL
Qty: 30 TABLET | Refills: 0 | Status: SHIPPED | OUTPATIENT
Start: 2022-03-02 | End: 2022-03-17 | Stop reason: SDUPTHER

## 2022-03-02 RX ORDER — EMPAGLIFLOZIN 25 MG/1
1 TABLET, FILM COATED ORAL DAILY
Qty: 90 TABLET | Refills: 0 | Status: SHIPPED | OUTPATIENT
Start: 2022-03-02 | End: 2022-07-25

## 2022-03-02 NOTE — TELEPHONE ENCOUNTER
pts home [facility] needs all meds filled and a new meter sent in.   Pended everything but the meter

## 2022-03-02 NOTE — TELEPHONE ENCOUNTER
Due for follow up with Dr Caroline Sharp- please call them to schedule at their earliest convenience. (meds have been refilled this time)

## 2022-03-17 ENCOUNTER — HOSPITAL ENCOUNTER (OUTPATIENT)
Dept: GENERAL RADIOLOGY | Age: 57
Discharge: HOME | End: 2022-03-17
Payer: COMMERCIAL

## 2022-03-17 ENCOUNTER — OFFICE VISIT (OUTPATIENT)
Dept: FAMILY MEDICINE CLINIC | Age: 57
End: 2022-03-17
Payer: COMMERCIAL

## 2022-03-17 ENCOUNTER — HOSPITAL ENCOUNTER (OUTPATIENT)
Age: 57
Discharge: HOME | End: 2022-03-17
Payer: COMMERCIAL

## 2022-03-17 VITALS
BODY MASS INDEX: 37.22 KG/M2 | HEART RATE: 87 BPM | WEIGHT: 218 LBS | SYSTOLIC BLOOD PRESSURE: 136 MMHG | OXYGEN SATURATION: 98 % | DIASTOLIC BLOOD PRESSURE: 82 MMHG | HEIGHT: 64 IN

## 2022-03-17 DIAGNOSIS — Z79.4 TYPE 2 DIABETES MELLITUS WITH DIABETIC NEPHROPATHY, WITH LONG-TERM CURRENT USE OF INSULIN (HCC): Primary | ICD-10-CM

## 2022-03-17 DIAGNOSIS — G89.29 CHRONIC PAIN OF RIGHT KNEE: ICD-10-CM

## 2022-03-17 DIAGNOSIS — E11.21 TYPE 2 DIABETES MELLITUS WITH DIABETIC NEPHROPATHY, WITHOUT LONG-TERM CURRENT USE OF INSULIN (HCC): ICD-10-CM

## 2022-03-17 DIAGNOSIS — I10 PRIMARY HYPERTENSION: ICD-10-CM

## 2022-03-17 DIAGNOSIS — M25.561 CHRONIC PAIN OF RIGHT KNEE: ICD-10-CM

## 2022-03-17 DIAGNOSIS — E11.21 TYPE 2 DIABETES MELLITUS WITH DIABETIC NEPHROPATHY, WITH LONG-TERM CURRENT USE OF INSULIN (HCC): Primary | ICD-10-CM

## 2022-03-17 DIAGNOSIS — R80.9 MICROALBUMINURIA: ICD-10-CM

## 2022-03-17 DIAGNOSIS — E78.00 PURE HYPERCHOLESTEROLEMIA: ICD-10-CM

## 2022-03-17 LAB — HBA1C MFR BLD: 9.4 %

## 2022-03-17 PROCEDURE — 73560 X-RAY EXAM OF KNEE 1 OR 2: CPT

## 2022-03-17 PROCEDURE — 3046F HEMOGLOBIN A1C LEVEL >9.0%: CPT | Performed by: FAMILY MEDICINE

## 2022-03-17 PROCEDURE — 99214 OFFICE O/P EST MOD 30 MIN: CPT | Performed by: FAMILY MEDICINE

## 2022-03-17 PROCEDURE — 83036 HEMOGLOBIN GLYCOSYLATED A1C: CPT | Performed by: FAMILY MEDICINE

## 2022-03-17 RX ORDER — GLIMEPIRIDE 4 MG/1
TABLET ORAL
Qty: 30 TABLET | Refills: 5 | Status: SHIPPED | OUTPATIENT
Start: 2022-03-17 | End: 2022-04-29

## 2022-03-17 RX ORDER — LOSARTAN POTASSIUM 100 MG/1
TABLET ORAL
Qty: 30 TABLET | Refills: 5 | Status: SHIPPED | OUTPATIENT
Start: 2022-03-17 | End: 2022-04-29

## 2022-03-17 RX ORDER — DULAGLUTIDE 0.75 MG/.5ML
0.75 INJECTION, SOLUTION SUBCUTANEOUS WEEKLY
Qty: 4 PEN | Refills: 3 | Status: SHIPPED | OUTPATIENT
Start: 2022-03-17 | End: 2022-05-10 | Stop reason: SDUPTHER

## 2022-03-17 RX ORDER — METOPROLOL SUCCINATE 25 MG/1
TABLET, EXTENDED RELEASE ORAL
Qty: 30 TABLET | Refills: 5 | Status: SHIPPED | OUTPATIENT
Start: 2022-03-17 | End: 2022-10-04 | Stop reason: SDUPTHER

## 2022-03-17 RX ORDER — LOVASTATIN 40 MG/1
TABLET ORAL
Qty: 30 TABLET | Refills: 5 | Status: SHIPPED | OUTPATIENT
Start: 2022-03-17 | End: 2022-10-04 | Stop reason: SDUPTHER

## 2022-03-17 RX ORDER — INSULIN DEGLUDEC 200 U/ML
INJECTION, SOLUTION SUBCUTANEOUS
Qty: 2 PEN | Refills: 0 | COMMUNITY
Start: 2022-03-17 | End: 2022-07-01
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
## 2022-03-18 ENCOUNTER — TELEPHONE (OUTPATIENT)
Dept: FAMILY MEDICINE CLINIC | Age: 57
End: 2022-03-18

## 2022-03-18 NOTE — LETTER
[address] [facility] [address] [facility]  Phone: [phone]  Fax: [phone]    Roque Romero MD        March 24, 2022     Patient: Raven Mendieta   YOB: 1965   Date of Visit: 3/18/2022       To Whom it May Concern:    Kathi Ruano was seen in my clinic on 3/18/2022. She called the office today 3/24/2022. She will be off work on Thursday 3/24/2022 and Friday 3/25/2022, due to illness. She can return to work on Monday 3/28/2022. If you have any questions or concerns, please don't hesitate to call.     Sincerely,         Roque Romero MD

## 2022-03-24 NOTE — TELEPHONE ENCOUNTER
Val Francois has very high blood sugars. Will need more aggressive diabetes treatments. It was hoped that the Trulicity would be affordable under her insurance plan. Already on 4 other meds. Is she able to find out her insurance plan's formulary? If she cannot do this medicine, then it will be necessary to start meal time insulin and I think she would need the support of an endocrinologist and their support staff for education and support.

## 2022-03-24 NOTE — TELEPHONE ENCOUNTER
Said she saw you on 3/17/22, now she is feeling under the weather, feeling weak and dizzy today. (started yesterday) Doesn't think she will feel okay to go to work tomorrow. .  Wanted to get a note for just today and tomorrow. If not better will make appt next week. Also she said she called her ins co, they don't cover the Trulicity you gave her. Could she get more samples. Samples given     will  note for work tomorrow morning and samples if you give her any more.

## 2022-03-25 NOTE — TELEPHONE ENCOUNTER
FILIPE,  Informed her of this, although I don't know that she really understood what I was telling her. She said she can not get the formulary for her meds. She will get the couple samples I got ready for her and she will be into get her note for work and sample.

## 2022-04-01 ENCOUNTER — OFFICE VISIT (OUTPATIENT)
Dept: ORTHOPEDIC SURGERY | Age: 57
End: 2022-04-01
Payer: COMMERCIAL

## 2022-04-01 VITALS — HEIGHT: 64 IN | WEIGHT: 218 LBS | BODY MASS INDEX: 37.22 KG/M2

## 2022-04-01 DIAGNOSIS — M17.11 PRIMARY OSTEOARTHRITIS OF RIGHT KNEE: Primary | ICD-10-CM

## 2022-04-01 PROCEDURE — 20610 DRAIN/INJ JOINT/BURSA W/O US: CPT | Performed by: ORTHOPAEDIC SURGERY

## 2022-04-01 PROCEDURE — 99203 OFFICE O/P NEW LOW 30 MIN: CPT | Performed by: ORTHOPAEDIC SURGERY

## 2022-04-01 RX ORDER — TRIAMCINOLONE ACETONIDE 40 MG/ML
40 INJECTION, SUSPENSION INTRA-ARTICULAR; INTRAMUSCULAR ONCE
Status: COMPLETED | OUTPATIENT
Start: 2022-04-01 | End: 2022-04-01

## 2022-04-01 RX ORDER — BUPIVACAINE HYDROCHLORIDE 2.5 MG/ML
2 INJECTION, SOLUTION INFILTRATION; PERINEURAL ONCE
Status: COMPLETED | OUTPATIENT
Start: 2022-04-01 | End: 2022-04-01

## 2022-04-01 RX ADMIN — BUPIVACAINE HYDROCHLORIDE 5 MG: 2.5 INJECTION, SOLUTION INFILTRATION; PERINEURAL at 11:25

## 2022-04-01 RX ADMIN — TRIAMCINOLONE ACETONIDE 40 MG: 40 INJECTION, SUSPENSION INTRA-ARTICULAR; INTRAMUSCULAR at 11:25

## 2022-04-01 NOTE — PROGRESS NOTES
[facility]  [facility]    Chief Complaint: Right knee pain    HPI:  Octavia Matamoros is a 61 y.o. who is here for initial evaluation of right knee pain. She reports a 3-year history of right knee pain with no prior injury or surgery. The pain is intermittent and mostly along her lateral joint line. Is worse with weightbearing activities including stairs and getting out of chair. She denies hip pain. She was seen in the emergency room for this same issue 2 weeks ago. She has been taking Tylenol and ibuprofen as needed for pain. She rates the pain as 8/10. She denies radiating pain down the leg. She does have diabetes. Patient Active Problem List   Diagnosis    Hypertension    Hyperlipidemia    Vitamin D deficiency    Solitary lung nodule- 5mm RML 2/14- rescan 2/15.  Type 2 diabetes mellitus with diabetic nephropathy (HCC)    Morbid obesity due to excess calories (HCC)    Uterine leiomyoma    Primary osteoarthritis of both knees    Microalbuminuria       ROS:  Constitutional: denies fever, chills, weight loss  MSK: denies pain in other joints, muscle aches  Neurological: denies numbness, tingling, weakness    Exam:  Height 5' 4\" (1.626 m), weight 218 lb (98.9 kg)    Appearance: sitting in exam room chair, appears to be in no acute distress, awake and alert  Resp: unlabored breathing on room air  Skin: warm, dry and intact with out erythema or significant increased temperature  Neuro: grossly intact both lower extremities. Intact sensation to light touch. Motor exam 4+ to 5/5 in all major motor groups. Right knee: Examination reveals that knee range of motion is 0 to 130 degrees. There is valgus deformity, positive crepitus, positive lateral joint line tenderness, positive antalgic gait. Neurologically, plantar flexion and dorsiflexion is intact. 5/5 strength.      Imaging:  AP weightbearing and sunrise views of the right knee were performed interpreted today.  Prior lateral radiograph was also reviewed today. She has mild tricompartmental degenerative changes with joint space narrowing. This is most significant in the lateral compartment. Assessment:  Mild right knee osteoarthritis    Plan:  We discussed the diagnosis and treatment options. She continues to have intermittent pain despite Tylenol and NSAID use. I recommended and performed a right knee steroid injection today as described below. She will continue activity as tolerated and follow-up here as needed. PROCEDURE NOTE:  After verbal consent was obtained, the patient's right knee was prepped with alcohol. Skin was anesthetized with ethyl chloride. The knee was then injected under sterile technique with 2mL of 0.25% marcaine and 1 mL of 40 mg/mL Kenalog. A bandage was applied. The patient tolerated the procedure well and there were no complications. Total time spent on today's encounter was at least 31 minutes. This time included reviewing prior notes, radiographs, and lab results when available, reviewing history obtained by medical assistant, performing history and physical exam, reviewing tests/radiographs with the patient, counseling the patient, ordering medications or tests, documentation in the electronic health record, and coordination of care. This dictation was done with Dragon dictation and may contain mechanical errors related to translation.

## 2022-04-29 RX ORDER — GLIMEPIRIDE 4 MG/1
TABLET ORAL
Qty: 30 TABLET | Refills: 2 | Status: SHIPPED | OUTPATIENT
Start: 2022-04-29 | End: 2022-08-01

## 2022-04-29 RX ORDER — LOSARTAN POTASSIUM 100 MG/1
TABLET ORAL
Qty: 30 TABLET | Refills: 2 | Status: SHIPPED | OUTPATIENT
Start: 2022-04-29 | End: 2022-08-01

## 2022-05-10 ENCOUNTER — TELEPHONE (OUTPATIENT)
Dept: FAMILY MEDICINE CLINIC | Age: 57
End: 2022-05-10

## 2022-05-10 RX ORDER — DULAGLUTIDE 0.75 MG/.5ML
0.75 INJECTION, SOLUTION SUBCUTANEOUS WEEKLY
Qty: 12 PEN | Refills: 1 | Status: SHIPPED | OUTPATIENT
Start: 2022-05-10 | End: 2022-05-24 | Stop reason: SDUPTHER

## 2022-05-10 NOTE — TELEPHONE ENCOUNTER
Stated that she is needing to switch to trulicity and [facility]. Stated that she would like samples in the mean time.     Please Advise  Pt can be reached at [phone]

## 2022-05-24 ENCOUNTER — TELEPHONE (OUTPATIENT)
Dept: FAMILY MEDICINE CLINIC | Age: 57
End: 2022-05-24

## 2022-05-24 RX ORDER — DULAGLUTIDE 0.75 MG/.5ML
0.75 INJECTION, SOLUTION SUBCUTANEOUS WEEKLY
Qty: 12 PEN | Refills: 1 | Status: SHIPPED | OUTPATIENT
Start: 2022-05-24 | End: 2022-05-27 | Stop reason: SDUPTHER

## 2022-05-24 NOTE — TELEPHONE ENCOUNTER
Pt came in wanting trulicity sent to pharmacy   Pt wants to know do we have any samples ?   [facility]  [facility]   Pt wanted to speak to Jerry Simmons advised she is with patients

## 2022-05-27 ENCOUNTER — TELEPHONE (OUTPATIENT)
Dept: FAMILY MEDICINE CLINIC | Age: 57
End: 2022-05-27

## 2022-05-27 RX ORDER — DULAGLUTIDE 0.75 MG/.5ML
0.75 INJECTION, SOLUTION SUBCUTANEOUS WEEKLY
Qty: 4 PEN | Refills: 0 | Status: SHIPPED | OUTPATIENT
Start: 2022-05-27 | End: 2022-07-01 | Stop reason: DRUGHIGH

## 2022-05-27 NOTE — TELEPHONE ENCOUNTER
I refilled her trulicity 6.52 mg weekly, but if she has been tolerating well and her sugars are still elevated, would need to increase dose to 1.5 mg.

## 2022-05-27 NOTE — TELEPHONE ENCOUNTER
----- Message from Xiao Cervantes sent at 5/27/2022  2:16 PM EDT -----  Subject: Refill Request    QUESTIONS  Name of Medication? Dulaglutide (TRULICITY) 2.49 WB/0.0MG SOPN  Patient-reported dosage and instructions? Inject 0.75 mg into the skin   once a week  How many days do you have left? 0  [facility] [place] phone number (if available)? [phone]  ---------------------------------------------------------------------------  --------------  CALL BACK INFO  What is the best way for the office to contact you? OK to leave message on   voicemail  Preferred Call Back Phone Number? [phone]  ---------------------------------------------------------------------------  --------------  SCRIPT ANSWERS  Relationship to Patient?  Self APER

## 2022-05-27 NOTE — TELEPHONE ENCOUNTER
----- Message from Yasmin Jimenez sent at 5/27/2022  2:16 PM EDT -----  Subject: Refill Request    QUESTIONS  Name of Medication? Dulaglutide (TRULICITY) 2.66 CE/9.9EY SOPN  Patient-reported dosage and instructions? Inject 0.75 mg into the skin   once a week  How many days do you have left? 0  [facility] [place] phone number (if available)? [phone]  ---------------------------------------------------------------------------  --------------  CALL BACK INFO  What is the best way for the office to contact you? OK to leave message on   voicemail  Preferred Call Back Phone Number? [phone]  ---------------------------------------------------------------------------  --------------  SCRIPT ANSWERS  Relationship to Patient?  Self

## 2022-05-31 ENCOUNTER — TELEPHONE (OUTPATIENT)
Dept: FAMILY MEDICINE CLINIC | Age: 57
End: 2022-05-31

## 2022-06-13 ENCOUNTER — TELEPHONE (OUTPATIENT)
Dept: OTHER | Facility: CLINIC | Age: 57
End: 2022-06-13

## 2022-06-13 NOTE — TELEPHONE ENCOUNTER
Octavia Matamoros, Was contacted today as part of [facility] to schedule a mammogram.       After speaking with patient has not had a recent mammogram.  Reminded patient that they have an open order from  Cherry Taylor MD and need to contact Central Scheduling to schedule a mammogram.    Spoke w/ pt, declined to schedule at this time. States she is not currently at home and I provided contact number to  for pt to call at her convenience.      Sherri Lara LPN

## 2022-07-01 ENCOUNTER — OFFICE VISIT (OUTPATIENT)
Dept: FAMILY MEDICINE CLINIC | Age: 57
End: 2022-07-01
Payer: COMMERCIAL

## 2022-07-01 VITALS
SYSTOLIC BLOOD PRESSURE: 130 MMHG | HEART RATE: 77 BPM | HEIGHT: 64 IN | RESPIRATION RATE: 18 BRPM | WEIGHT: 226.8 LBS | BODY MASS INDEX: 38.72 KG/M2 | DIASTOLIC BLOOD PRESSURE: 80 MMHG | OXYGEN SATURATION: 100 %

## 2022-07-01 DIAGNOSIS — E78.00 PURE HYPERCHOLESTEROLEMIA: ICD-10-CM

## 2022-07-01 DIAGNOSIS — Z79.4 TYPE 2 DIABETES MELLITUS WITH DIABETIC NEPHROPATHY, WITH LONG-TERM CURRENT USE OF INSULIN (HCC): Primary | ICD-10-CM

## 2022-07-01 DIAGNOSIS — E11.21 TYPE 2 DIABETES MELLITUS WITH DIABETIC NEPHROPATHY, WITH LONG-TERM CURRENT USE OF INSULIN (HCC): Primary | ICD-10-CM

## 2022-07-01 DIAGNOSIS — I10 PRIMARY HYPERTENSION: ICD-10-CM

## 2022-07-01 DIAGNOSIS — M17.0 PRIMARY OSTEOARTHRITIS OF BOTH KNEES: ICD-10-CM

## 2022-07-01 LAB
A/G RATIO: 1.4 (ref 1.1–2.2)
ALBUMIN SERPL-MCNC: 4.2 G/DL (ref 3.4–5)
ALP BLD-CCNC: 99 U/L (ref 40–129)
ALT SERPL-CCNC: 17 U/L (ref 10–40)
ANION GAP SERPL CALCULATED.3IONS-SCNC: 14 MMOL/L (ref 3–16)
AST SERPL-CCNC: 15 U/L (ref 15–37)
BILIRUB SERPL-MCNC: 0.4 MG/DL (ref 0–1)
BUN BLDV-MCNC: 17 MG/DL (ref 7–20)
CALCIUM SERPL-MCNC: 9.8 MG/DL (ref 8.3–10.6)
CHLORIDE BLD-SCNC: 101 MMOL/L (ref 99–110)
CHOLESTEROL, TOTAL: 150 MG/DL (ref 0–199)
CO2: 25 MMOL/L (ref 21–32)
CREAT SERPL-MCNC: 0.6 MG/DL (ref 0.6–1.1)
GFR AFRICAN AMERICAN: >60
GFR NON-AFRICAN AMERICAN: >60
GLUCOSE BLD-MCNC: 235 MG/DL (ref 70–99)
HBA1C MFR BLD: 8.4 %
HDLC SERPL-MCNC: 63 MG/DL (ref 40–60)
LDL CHOLESTEROL CALCULATED: 65 MG/DL
POTASSIUM SERPL-SCNC: 4.1 MMOL/L (ref 3.5–5.1)
SODIUM BLD-SCNC: 140 MMOL/L (ref 136–145)
TOTAL PROTEIN: 7.1 G/DL (ref 6.4–8.2)
TRIGL SERPL-MCNC: 112 MG/DL (ref 0–150)
VLDLC SERPL CALC-MCNC: 22 MG/DL

## 2022-07-01 PROCEDURE — 99214 OFFICE O/P EST MOD 30 MIN: CPT | Performed by: FAMILY MEDICINE

## 2022-07-01 PROCEDURE — 3052F HG A1C>EQUAL 8.0%<EQUAL 9.0%: CPT | Performed by: FAMILY MEDICINE

## 2022-07-01 PROCEDURE — 83036 HEMOGLOBIN GLYCOSYLATED A1C: CPT | Performed by: FAMILY MEDICINE

## 2022-07-01 RX ORDER — CELECOXIB 200 MG/1
200 CAPSULE ORAL DAILY
Qty: 60 CAPSULE | Refills: 3 | Status: SHIPPED | OUTPATIENT
Start: 2022-07-01 | End: 2022-07-01 | Stop reason: CLARIF

## 2022-07-01 RX ORDER — CELECOXIB 200 MG/1
200 CAPSULE ORAL DAILY
Qty: 90 CAPSULE | Refills: 1 | Status: SHIPPED | OUTPATIENT
Start: 2022-07-01 | End: 2022-10-04

## 2022-07-01 RX ORDER — INSULIN DEGLUDEC 200 U/ML
INJECTION, SOLUTION SUBCUTANEOUS
Qty: 2 PEN | Refills: 0 | Status: SHIPPED | OUTPATIENT
Start: 2022-07-01 | End: 2022-10-04 | Stop reason: SDUPTHER

## 2022-07-01 ASSESSMENT — PATIENT HEALTH QUESTIONNAIRE - PHQ9
SUM OF ALL RESPONSES TO PHQ QUESTIONS 1-9: 0
1. LITTLE INTEREST OR PLEASURE IN DOING THINGS: 0
SUM OF ALL RESPONSES TO PHQ QUESTIONS 1-9: 0
SUM OF ALL RESPONSES TO PHQ QUESTIONS 1-9: 0
SUM OF ALL RESPONSES TO PHQ9 QUESTIONS 1 & 2: 0
2. FEELING DOWN, DEPRESSED OR HOPELESS: 0
SUM OF ALL RESPONSES TO PHQ QUESTIONS 1-9: 0

## 2022-07-01 NOTE — PROGRESS NOTES
2022    Blood pressure 130/80, pulse 77, resp. rate 18, height 5' 4\" (1.626 m), weight 226 lb 12.8 oz (102.9 kg), last menstrual period 2012, SpO2 100 %, not currently breastfeeding. Lexie Moulton (:  1965) is a 61 y.o. female, here for evaluation of the following medical concerns:    Chief Complaint   Patient presents with    3 Month Follow-Up     Pt is here to follow up on diabetes.  Knee Pain     Pt is having right leg pain, this has happened before and saw Taylor Ramirez and was given in injection of the right knee. Pt said the pain went away but then came back and the pain is no unbearable. Here for routine follow up of DM-2. Has had poor control for past year or more. Significant trouble with med compliance, mostly due to insurance coverage. Trulicity is still costing her $250 per month. Is on 0.75 mg dose. She has talked to insurance and cannot improve coverage. They 'don't cover' the GLP-1 drug class. On trulicity . 75 her a1c has improved to 8.5 today. She is also on metformin 2 gm, jardiance 25, tresiba 30, amaryl 4 mg. Oddly, her weight is up as her sugars are better controlled. she tests glucose levels in the mornings. Did not brin glog, but says that her sugars are usually 140-150 in the mornings. Lab Results   Component Value Date/Time    LABA1C 9.4 2022 09:18 AM    LABA1C 9.7 10/15/2021 08:35 AM    LABA1C 12.1 2021 10:06 AM      She has OA of knees and is feeling worsening OA pain. She did see ortho and had an injection of kenalog. This helped for about 3 months, but josé is back now. R is only side that hurts her. She is against having surgery    She has used celebrex in past with good effect.       Last renal function test:   Lab Results   Component Value Date/Time     2021 10:36 AM    K 4.3 2021 10:36 AM    BUN 12 2021 10:36 AM    CREATININE 0.7 2021 10:36 AM     CrCl cannot be calculated (Patient's most recent lab result is older than the maximum 180 days allowed. ). Takes lova 36; no CVD. Due for lipid testing. No myalgias. Last lipid test:  Lab Results   Component Value Date    CHOL 135 07/19/2021    TRIG 76 07/19/2021    HDL 65 (H) 07/19/2021    LDLCALC 55 07/19/2021     Lab Results   Component Value Date    ALT 12 07/19/2021    AST 12 (L) 07/19/2021         Patient Active Problem List   Diagnosis    Hypertension    Hyperlipidemia    Vitamin D deficiency    Solitary lung nodule- 5mm RML 2/14- rescan 2/15.  Type 2 diabetes mellitus with diabetic nephropathy (HCC)    Morbid obesity due to excess calories (HCC)    Uterine leiomyoma    Primary osteoarthritis of both knees    Microalbuminuria        Body mass index is 38.93 kg/m². Wt Readings from Last 3 Encounters:   07/01/22 226 lb 12.8 oz (102.9 kg)   04/01/22 218 lb (98.9 kg)   03/17/22 218 lb (98.9 kg)       BP Readings from Last 3 Encounters:   07/01/22 130/80   03/17/22 136/82   10/15/21 (!) 150/86       Allergies   Allergen Reactions    Ppd        Prior to Visit Medications    Medication Sig Taking?  Authorizing Provider   metFORMIN (GLUCOPHAGE) 1000 MG tablet TAKE 1 TABLET BY MOUTH TWICE DAILY WITH MEALS Yes Arti Morrow MD   Dulaglutide 0.75 MG/0.5ML SOPN Inject 0.75 mg into the skin once a week Yes Arti Morrow MD   Dulaglutide (TRULICITY) 2.83 WE/0.8DM SOPN Inject 0.75 mg into the skin once a week Yes Alexi Schwartz, APRN - CNP   losartan (COZAAR) 100 MG tablet Take 1 tablet by mouth once daily Yes Arti Morrow MD   glimepiride (AMARYL) 4 MG tablet TAKE 1 TABLET BY MOUTH ONCE DAILY IN THE MORNING BEFORE BREAKFAST Yes Arti Morrow MD   metoprolol succinate (TOPROL XL) 25 MG extended release tablet TAKE 1 TABLET BY MOUTH EVERY DAY Yes Arti Morrow MD   lovastatin (MEVACOR) 40 MG tablet Take 1 tablet by mouth once daily Yes Arti Morrow MD   Insulin Degludec (TRESIBA FLEXTOUCH) 200 UNIT/ML SOPN Take 30 units daily Yes Cadence Issa MD   empagliflozin (JARDIANCE) 25 MG tablet Take 1 tablet by mouth daily Yes Cadence Issa MD   aMILoride-HCTZ 5-50 MG TABS Take 1 tablet by mouth daily Yes Cadence Issa MD   Insulin Pen Needle (B-D UF III MINI PEN NEEDLES) 31G X 5 MM MISC USE 1  SUBCUTANEOUSLY ONCE DAILY Yes Cadence Issa MD   Insulin Pen Needle ([employer] PEN NEEDLES) 31G X 6 MM MISC 1 each by Does not apply route daily Yes Cadence Issa MD   diclofenac sodium (VOLTAREN) 1 % GEL APPLY 4 GRAMS TOPICALLY 4 TIMES DAILY Yes Cadence Issa MD   Multiple Vitamins-Minerals (CENTRUM PO) Take  by mouth daily. Yes Historical Provider, MD        Social History     Tobacco Use    Smoking status: Never Smoker    Smokeless tobacco: Never Used   Vaping Use    Vaping Use: Never used   Substance Use Topics    Alcohol use: No    Drug use: No       Review of Systems    Physical Exam  Vitals and nursing note reviewed. Constitutional:       Appearance: Normal appearance. She is well-developed. She is obese. Neck:      Thyroid: No thyromegaly. Cardiovascular:      Rate and Rhythm: Normal rate and regular rhythm. Pulses: Normal pulses. Heart sounds: Murmur (soft systolic murmur URSB ) heard. Pulmonary:      Effort: Pulmonary effort is normal. No respiratory distress. Breath sounds: Normal breath sounds. No wheezing or rales. Musculoskeletal:         General: Normal range of motion. Cervical back: Normal range of motion. Comments: bilat knees with bony enlargement, R>L. Valgus deformity. Medial JLT. Mild effusion on R   Skin:     General: Skin is warm and dry. Neurological:      General: No focal deficit present. Mental Status: She is alert and oriented to person, place, and time. Mental status is at baseline.    Psychiatric:         Mood and Affect: Mood normal.         Behavior: Behavior normal.

## 2022-07-01 NOTE — PATIENT INSTRUCTIONS
Tresiba dose: increase to 32 units daily. If your morning glucose is still over 130 most days, increase the dose again to 34 units after 1 week. If your morning glucose is still over 130 most days, increase the dose again to 36 units after 1 week. You may continue to do this weekly until your fasting glucose levels are under 130. If you have LOW blood sugars (less than 60) at any time, please call right away.

## 2022-07-05 RX ORDER — PEN NEEDLE, DIABETIC 31 GX5/16"
NEEDLE, DISPOSABLE MISCELLANEOUS
Qty: 100 EACH | Refills: 2 | Status: SHIPPED | OUTPATIENT
Start: 2022-07-05

## 2022-07-07 ENCOUNTER — OFFICE VISIT (OUTPATIENT)
Dept: ORTHOPEDIC SURGERY | Age: 57
End: 2022-07-07
Payer: COMMERCIAL

## 2022-07-07 VITALS — HEIGHT: 64 IN | BODY MASS INDEX: 37.22 KG/M2 | WEIGHT: 218 LBS

## 2022-07-07 DIAGNOSIS — M17.11 PRIMARY OSTEOARTHRITIS OF RIGHT KNEE: Primary | ICD-10-CM

## 2022-07-07 PROCEDURE — 20610 DRAIN/INJ JOINT/BURSA W/O US: CPT | Performed by: ORTHOPAEDIC SURGERY

## 2022-07-07 PROCEDURE — 99213 OFFICE O/P EST LOW 20 MIN: CPT | Performed by: ORTHOPAEDIC SURGERY

## 2022-07-07 RX ORDER — BUPIVACAINE HYDROCHLORIDE 2.5 MG/ML
2 INJECTION, SOLUTION INFILTRATION; PERINEURAL ONCE
Status: COMPLETED | OUTPATIENT
Start: 2022-07-07 | End: 2022-07-07

## 2022-07-07 RX ORDER — TRIAMCINOLONE ACETONIDE 40 MG/ML
40 INJECTION, SUSPENSION INTRA-ARTICULAR; INTRAMUSCULAR ONCE
Status: COMPLETED | OUTPATIENT
Start: 2022-07-07 | End: 2022-07-07

## 2022-07-07 RX ADMIN — BUPIVACAINE HYDROCHLORIDE 5 MG: 2.5 INJECTION, SOLUTION INFILTRATION; PERINEURAL at 16:16

## 2022-07-07 RX ADMIN — TRIAMCINOLONE ACETONIDE 40 MG: 40 INJECTION, SUSPENSION INTRA-ARTICULAR; INTRAMUSCULAR at 16:16

## 2022-07-08 NOTE — PROGRESS NOTES
[facility]  Office Visit    Chief Complaint: Right knee pain    HPI:  Lara Guzman is a 61 y.o. who is here in follow-up of right knee pain. For review, she reports a 3-year history of right knee pain with no prior injury or surgery. The pain is intermittent and mostly along her lateral joint line. It is worse with weightbearing activities including stairs and getting out of chair. She denies hip pain. She was last seen on April 1, 2020 steroid injection helped for 2 months. The pain has now returned and she rates it as 9/10. She denies radiating pain down the leg. She does have diabetes. Patient Active Problem List   Diagnosis    Hypertension    Hyperlipidemia    Vitamin D deficiency    Solitary lung nodule- 5mm RML 2/14- rescan 2/15.  Type 2 diabetes mellitus with diabetic nephropathy (HCC)    Morbid obesity due to excess calories (HCC)    Uterine leiomyoma    Primary osteoarthritis of both knees    Microalbuminuria       ROS:  Constitutional: denies fever, chills, weight loss  MSK: denies pain in other joints, muscle aches  Neurological: denies numbness, tingling, weakness    Exam:  Appearance: sitting in exam room chair, appears to be in no acute distress, awake and alert  Resp: unlabored breathing on room air  Skin: warm, dry and intact with out erythema or significant increased temperature  Neuro: grossly intact both lower extremities. Intact sensation to light touch. Motor exam 4+ to 5/5 in all major motor groups. Right knee: Examination reveals that knee range of motion is 0 to 130 degrees. There is valgus deformity, positive crepitus, positive lateral joint line tenderness, positive antalgic gait. Neurologically, plantar flexion and dorsiflexion is intact. 5/5 strength. Imaging:  Prior right knee radiographs reviewed today. She has mild tricompartmental degenerative changes with joint space narrowing.   This is most significant in the lateral compartment. Assessment:  Mild right knee osteoarthritis    Plan:  We discussed the diagnosis and treatment options. She had pain relief with a steroid injection 3 months ago but this is now worn off. I recommended repeat steroid injection in the right knee and this was performed as described above. We also discussed viscosupplementation injections and she elected proceed this. She will return for supplementation injection once approved when her knee pain returns. PROCEDURE NOTE:  After verbal consent was obtained, the patient's right knee was prepped with alcohol. Skin was anesthetized with ethyl chloride. The knee was then injected under sterile technique with 2mL of 0.25% marcaine and 1 mL of 40 mg/mL Kenalog. A bandage was applied. The patient tolerated the procedure well and there were no complications. Total time spent on today's encounter was at least 22 minutes. This time included reviewing prior notes, radiographs, and lab results when available, reviewing history obtained by medical assistant, performing history and physical exam, reviewing tests/radiographs with the patient, counseling the patient, ordering medications or tests, documentation in the electronic health record, and coordination of care. This dictation was done with Dragon dictation and may contain mechanical errors related to translation.

## 2022-07-21 ENCOUNTER — TELEPHONE (OUTPATIENT)
Dept: FAMILY MEDICINE CLINIC | Age: 57
End: 2022-07-21

## 2022-07-21 NOTE — TELEPHONE ENCOUNTER
Patient calling would like to know if we have samples of trulicity in office? Please give patient a call back if she can have some samples.    Patient is almost of this medication   Please advise

## 2022-07-21 NOTE — TELEPHONE ENCOUNTER
Informed her we do have 2 boxes of trulicity 1.5 mg (4 pens)  Lot: [id number]  Exp: 9/9/2023  Not put in Epic, would not let me order the med to sign off on sample given.   Not Ordered in chart  She will  on friday

## 2022-07-24 DIAGNOSIS — E11.21 TYPE 2 DIABETES MELLITUS WITH DIABETIC NEPHROPATHY, WITHOUT LONG-TERM CURRENT USE OF INSULIN (HCC): ICD-10-CM

## 2022-07-25 RX ORDER — EMPAGLIFLOZIN 25 MG/1
TABLET, FILM COATED ORAL DAILY
Qty: 90 TABLET | Refills: 0 | Status: SHIPPED | OUTPATIENT
Start: 2022-07-25 | End: 2022-10-04

## 2022-08-01 RX ORDER — LOSARTAN POTASSIUM 100 MG/1
TABLET ORAL
Qty: 30 TABLET | Refills: 5 | Status: SHIPPED | OUTPATIENT
Start: 2022-08-01 | End: 2022-10-04 | Stop reason: SDUPTHER

## 2022-08-01 RX ORDER — GLIMEPIRIDE 4 MG/1
TABLET ORAL
Qty: 30 TABLET | Refills: 5 | Status: SHIPPED | OUTPATIENT
Start: 2022-08-01 | End: 2022-10-04 | Stop reason: SDUPTHER

## 2022-08-10 ENCOUNTER — TELEPHONE (OUTPATIENT)
Dept: FAMILY MEDICINE CLINIC | Age: 57
End: 2022-08-10

## 2022-08-10 NOTE — TELEPHONE ENCOUNTER
Pt would like to get more samples of trulicity 1.33FP/1.9OO. She will use her last one on Friday this week.     Please Advise  Pt can be reached at [phone]

## 2022-08-11 NOTE — TELEPHONE ENCOUNTER
Called pt   Pt is unsure ifs its 9.86 or 1.5 for barringtonty   Pt said said she will call us back when she gets home to clarify   Thank you

## 2022-08-17 ENCOUNTER — TELEPHONE (OUTPATIENT)
Dept: ORTHOPEDIC SURGERY | Age: 57
End: 2022-08-17

## 2022-08-17 NOTE — TELEPHONE ENCOUNTER
Please call patient at [phone]. Said someone was supposed to get in touch with her and let her know what the insurance said and she has not heard anything, and just wanted to follow up.

## 2022-08-17 NOTE — TELEPHONE ENCOUNTER
7/14/22 [employer]FLE[employer]A  (SERIES OF 3)  RIGHT KNEE. DENIED. NO COVERAGE - CASH BASED PROGRAM.     NOT A COVERED MEDICAL OR PHARMACY BENEFIT FOR THIS AMERICAN PLAN ADMINISTRATORS PLAN. PER/CALL REF:  KAIT @ 1:06PM EST ON 7/14/22.     I called the patient and left a message

## 2022-09-15 ENCOUNTER — TELEPHONE (OUTPATIENT)
Dept: FAMILY MEDICINE CLINIC | Age: 57
End: 2022-09-15

## 2022-09-15 NOTE — TELEPHONE ENCOUNTER
----- Message from Zakia Iker sent at 9/15/2022  2:15 PM EDT -----  Subject: Message to Provider    QUESTIONS  Information for Provider? pt called and was asking for trulisty - asking   for 1.5 mgs - stated she got samples from office before   ---------------------------------------------------------------------------  --------------  [id number]  [phone]; OK to leave message on voicemail  ---------------------------------------------------------------------------  --------------  SCRIPT ANSWERS  Relationship to Patient?  Self

## 2022-09-15 NOTE — TELEPHONE ENCOUNTER
Called pt back   Pt wants samples of trulicity 1.5   Advised pt we can do that   Pt said she will  tomorrow   Thank you

## 2022-09-19 ENCOUNTER — OFFICE VISIT (OUTPATIENT)
Dept: ORTHOPEDIC SURGERY | Age: 57
End: 2022-09-19

## 2022-09-19 VITALS — BODY MASS INDEX: 37.22 KG/M2 | WEIGHT: 218 LBS | HEIGHT: 64 IN

## 2022-09-19 DIAGNOSIS — M17.11 PRIMARY OSTEOARTHRITIS OF RIGHT KNEE: ICD-10-CM

## 2022-09-19 DIAGNOSIS — M70.51 PES ANSERINUS BURSITIS OF RIGHT KNEE: Primary | ICD-10-CM

## 2022-09-19 RX ORDER — TRIAMCINOLONE ACETONIDE 40 MG/ML
40 INJECTION, SUSPENSION INTRA-ARTICULAR; INTRAMUSCULAR ONCE
Status: COMPLETED | OUTPATIENT
Start: 2022-09-19 | End: 2022-09-19

## 2022-09-19 RX ORDER — BUPIVACAINE HYDROCHLORIDE 2.5 MG/ML
2 INJECTION, SOLUTION INFILTRATION; PERINEURAL ONCE
Status: COMPLETED | OUTPATIENT
Start: 2022-09-19 | End: 2022-09-19

## 2022-09-19 RX ADMIN — TRIAMCINOLONE ACETONIDE 40 MG: 40 INJECTION, SUSPENSION INTRA-ARTICULAR; INTRAMUSCULAR at 11:00

## 2022-09-19 RX ADMIN — BUPIVACAINE HYDROCHLORIDE 5 MG: 2.5 INJECTION, SOLUTION INFILTRATION; PERINEURAL at 10:59

## 2022-09-21 NOTE — PROGRESS NOTES
This dictation was done with [employer] dictation and may contain mechanical errors related to translation. Height 5' 4\" (1.626 m), weight 218 lb (98.9 kg), last menstrual period 09/17/2012, not currently breastfeeding. This is a very pleasant 59-year-old female has some osteoarthritis in her right knee. She had a cortisone shot for that arthritis on 7/7/2022 and had a lot of relief for 4 to 6 weeks. Now she is having more pain in the anterior medial aspect of the knee just below the joint line on the right side. Examination is consistent with pes bursitis and with her consent she was injected with 1 cc Kenalog and 2 cc of Marcaine into the Pez anserine area. Impression is right knee osteoarthritis and pes bursitis. Very lengthy discussion about short long-term expectations for her and I think she would be a good candidate for hyaluronic acid injections and we will look to get this preapproved. This patient has a well documented history of osteoarthritis in their knee. They have trialed Nsaid medications, physical therapy exercises and steroid injections. They continue to have pain, swelling and dysfunction. At this junction, hyalgen injections are advisable. I gave them literature and discussed the risks and benefits with them and would like to seek pre-authorization for    During today's visit, there was approximately 20 minutes of face-to-face discussion in regards to the patient's current condition and treatment options. More than 50 % of the time was counseling and coordination of care.

## 2022-09-28 ENCOUNTER — TELEPHONE (OUTPATIENT)
Dept: ORTHOPEDIC SURGERY | Age: 57
End: 2022-09-28

## 2022-09-28 NOTE — TELEPHONE ENCOUNTER
Tami Brewer MA  [facility] Schedule Staff  [facility]  (SERIES OF 3)  RIGHT KNEE. DENIED. NO COVERAGE - CASH BASED PROGRAM.       NOT A COVERED MEDICAL OR PHARMACY BENEFIT FOR THIS AMERICAN PLAN ADMINISTRATORS PLAN. PER/CALL REF:  FRANCISCO @ 11:39AM EST ON 9/28/22.      I called the patient and left a message

## 2022-10-03 ENCOUNTER — TELEPHONE (OUTPATIENT)
Dept: ORTHOPEDIC SURGERY | Age: 57
End: 2022-10-03

## 2022-10-03 NOTE — TELEPHONE ENCOUNTER
I called the patient and gave her the option for Coolief, purchase visco herself, or TKA.     She wants to set up apt with Dr. Pauline Belle

## 2022-10-03 NOTE — TELEPHONE ENCOUNTER
PATIENT CALLED ASKING ABOUT THE INJECTIONS. I CHECKED HER CHART AND LET HER KNOW THE INJECTIONS WERE DENIED BY HER INSURANCE AND A MESSAGE WAS LEFT ON HER VOICE MAIL. SHE WOULD LIKE TO KNOW WHAT HER NEXT STEPS SHOULD BE. PLEASE CALL PATIENT.

## 2022-10-04 ENCOUNTER — HOSPITAL ENCOUNTER (OUTPATIENT)
Dept: GENERAL RADIOLOGY | Age: 57
Discharge: HOME | End: 2022-10-04
Payer: COMMERCIAL

## 2022-10-04 ENCOUNTER — OFFICE VISIT (OUTPATIENT)
Dept: FAMILY MEDICINE CLINIC | Age: 57
End: 2022-10-04
Payer: COMMERCIAL

## 2022-10-04 ENCOUNTER — HOSPITAL ENCOUNTER (OUTPATIENT)
Age: 57
Discharge: HOME | End: 2022-10-04
Payer: COMMERCIAL

## 2022-10-04 VITALS
RESPIRATION RATE: 16 BRPM | SYSTOLIC BLOOD PRESSURE: 154 MMHG | OXYGEN SATURATION: 97 % | HEIGHT: 64 IN | DIASTOLIC BLOOD PRESSURE: 102 MMHG | TEMPERATURE: 98.5 F | HEART RATE: 90 BPM | BODY MASS INDEX: 37.59 KG/M2 | WEIGHT: 220.2 LBS

## 2022-10-04 DIAGNOSIS — E11.21 TYPE 2 DIABETES MELLITUS WITH DIABETIC NEPHROPATHY, WITH LONG-TERM CURRENT USE OF INSULIN (HCC): Primary | ICD-10-CM

## 2022-10-04 DIAGNOSIS — E11.21 TYPE 2 DIABETES MELLITUS WITH DIABETIC NEPHROPATHY, WITHOUT LONG-TERM CURRENT USE OF INSULIN (HCC): ICD-10-CM

## 2022-10-04 DIAGNOSIS — S20.212A RIB CONTUSION, LEFT, INITIAL ENCOUNTER: ICD-10-CM

## 2022-10-04 DIAGNOSIS — I10 PRIMARY HYPERTENSION: Chronic | ICD-10-CM

## 2022-10-04 DIAGNOSIS — Z79.4 TYPE 2 DIABETES MELLITUS WITH DIABETIC NEPHROPATHY, WITH LONG-TERM CURRENT USE OF INSULIN (HCC): Primary | ICD-10-CM

## 2022-10-04 LAB — HBA1C MFR BLD: 6.6 %

## 2022-10-04 PROCEDURE — 83036 HEMOGLOBIN GLYCOSYLATED A1C: CPT | Performed by: FAMILY MEDICINE

## 2022-10-04 PROCEDURE — 99214 OFFICE O/P EST MOD 30 MIN: CPT | Performed by: FAMILY MEDICINE

## 2022-10-04 PROCEDURE — 71101 X-RAY EXAM UNILAT RIBS/CHEST: CPT

## 2022-10-04 PROCEDURE — 3044F HG A1C LEVEL LT 7.0%: CPT | Performed by: FAMILY MEDICINE

## 2022-10-04 RX ORDER — CELECOXIB 200 MG/1
200 CAPSULE ORAL DAILY
Qty: 90 CAPSULE | Refills: 1 | Status: CANCELLED | OUTPATIENT
Start: 2022-10-04

## 2022-10-04 RX ORDER — METOPROLOL SUCCINATE 25 MG/1
TABLET, EXTENDED RELEASE ORAL
Qty: 90 TABLET | Refills: 1 | Status: SHIPPED | OUTPATIENT
Start: 2022-10-04

## 2022-10-04 RX ORDER — LOVASTATIN 40 MG/1
TABLET ORAL
Qty: 90 TABLET | Refills: 1 | Status: SHIPPED | OUTPATIENT
Start: 2022-10-04

## 2022-10-04 RX ORDER — INSULIN DEGLUDEC 200 U/ML
INJECTION, SOLUTION SUBCUTANEOUS
Qty: 6 ADJUSTABLE DOSE PRE-FILLED PEN SYRINGE | Refills: 1 | Status: SHIPPED | OUTPATIENT
Start: 2022-10-04

## 2022-10-04 RX ORDER — EMPAGLIFLOZIN 25 MG/1
TABLET, FILM COATED ORAL DAILY
Qty: 90 TABLET | Refills: 0 | Status: SHIPPED | OUTPATIENT
Start: 2022-10-04 | End: 2022-10-04 | Stop reason: SDUPTHER

## 2022-10-04 RX ORDER — LOSARTAN POTASSIUM 100 MG/1
TABLET ORAL
Qty: 90 TABLET | Refills: 1 | Status: SHIPPED | OUTPATIENT
Start: 2022-10-04

## 2022-10-04 RX ORDER — AMILORIDE HYDROCHLORIDE AND HYDROCHLOROTHIAZIDE 5; 50 MG/1; MG/1
1 TABLET ORAL DAILY
Qty: 90 TABLET | Refills: 1 | Status: SHIPPED | OUTPATIENT
Start: 2022-10-04

## 2022-10-04 RX ORDER — GLIMEPIRIDE 4 MG/1
TABLET ORAL
Qty: 90 TABLET | Refills: 1 | Status: SHIPPED | OUTPATIENT
Start: 2022-10-04

## 2022-10-04 SDOH — ECONOMIC STABILITY: FOOD INSECURITY: WITHIN THE PAST 12 MONTHS, THE FOOD YOU BOUGHT JUST DIDN'T LAST AND YOU DIDN'T HAVE MONEY TO GET MORE.: NEVER TRUE

## 2022-10-04 SDOH — ECONOMIC STABILITY: FOOD INSECURITY: WITHIN THE PAST 12 MONTHS, YOU WORRIED THAT YOUR FOOD WOULD RUN OUT BEFORE YOU GOT MONEY TO BUY MORE.: NEVER TRUE

## 2022-10-04 ASSESSMENT — SOCIAL DETERMINANTS OF HEALTH (SDOH): HOW HARD IS IT FOR YOU TO PAY FOR THE VERY BASICS LIKE FOOD, HOUSING, MEDICAL CARE, AND HEATING?: NOT HARD AT ALL

## 2022-10-04 NOTE — LETTER
[id number] [place] [address]  Phone: [phone]  Fax: [phone]    Gael Rico MD        October 4, 2022     Patient: Evette Kirby   YOB: 1965   Date of Visit: 10/4/2022       To Whom it May Concern:    Annia Patterson was seen in my clinic on 10/4/2022. She may return to work on 10/06/2022. If you have any questions or concerns, please don't hesitate to call.     Sincerely,         Gael Rico MD
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
## 2022-10-05 ENCOUNTER — OFFICE VISIT (OUTPATIENT)
Dept: ORTHOPEDIC SURGERY | Age: 57
End: 2022-10-05

## 2022-10-05 DIAGNOSIS — M17.11 PRIMARY OSTEOARTHRITIS OF RIGHT KNEE: Primary | ICD-10-CM

## 2022-10-05 RX ORDER — METOPROLOL SUCCINATE 25 MG/1
TABLET, EXTENDED RELEASE ORAL
Qty: 30 TABLET | Refills: 0 | OUTPATIENT
Start: 2022-10-05

## 2022-10-05 RX ORDER — LOVASTATIN 40 MG/1
TABLET ORAL
Qty: 30 TABLET | Refills: 0 | OUTPATIENT
Start: 2022-10-05

## 2022-10-05 NOTE — PROGRESS NOTES
Chief Complaint   Patient presents with    Knee Pain     R knee pain. Ref. By Elaina Shetty to talk about Coolief procedure. HPI:      Sylvia Andrade is a very pleasant 61 y.o. [place] female who presents for evaluation of chronic right knee pain. She is having right knee pain for quite some time now and is associated with swelling, cracking sounds, and some mildly decreased knee extension. She has been to see Dr. Elaina Shetty where she has received steroid injections in the knee which have not provided with her significant or lasting relief. Unfortunately, the hyaluronic acid injections were denied by her insurance. Dr. Elaina Shetty discussed with her the potential option of knee replacement, but she was asking about other options and so she comes in today to learn more about the Coolief procedure. Surgeons notes reviewed. Past Medical History:   Diagnosis Date    Hyperlipidemia     Hypertension     Solitary lung nodule- 5mm RML 2/14- rescan 2/15. 3/3/2014    Type II or unspecified type diabetes mellitus without mention of complication, not stated as uncontrolled        Medications and allergies were reviewed as necessary. Review of Systems:  Pertinent items are noted in HPI. Physical Examination: This is a pleasant female, alert, and in no acute distress. There were no vitals filed for this visit. Right knee exam: There is a moderate effusion in the right knee. She lacks about 10 degrees of extension. Strength is mildly reduced with knee extension compared to the left knee, but without pain. There is crepitus with range of motion. There is bilateral joint line tenderness.     Vascular exam: Extremities warm and well perfused, no significant edema    Respiratory exam: Breathing easy and unlabored    Neuro exam: No focal neuro deficits    Lymphatic: No obvious lymphadenopathy    Skin: Warm, dry    Radiology:     X-rays of the right knee from April and March of this year were reviewed independently today and discussed with the patient. The films revealed: Moderate to severe tricompartmental osteoarthritis of the right knee. Assessment and Plan:     1. Primary osteoarthritis of right knee  She has chronic and severe right knee pain as result of osteoarthritis of the knee. She has tried conservative therapies including prescription strength NSAIDs, steroid injections, exercises, but has had no significant relief of symptoms. She would like to avoid a knee replacement if possible so we discussed the risks and benefits of the Coolief procedure today. I did explain to her that the procedure would help with the pain, but would not be expected to help with the swelling, crepitus, or mild loss of range of motion. She acknowledged understanding of the procedure and its goals and associated risks and would like to work on insurance approval for the procedure. Follow-up: For diagnostic geniculate nerve blocks pending insurance approval.  Sooner with any problems, questions, concerns, or worsening symptoms. Laura Maxwell MD  Primary Care Sports Medicine    Please note that this chart was at least partially generated using dragon dictation software. Although every effort was made to ensure the accuracy of this automated transcription, some errors in transcription may have occurred.

## 2022-10-27 ENCOUNTER — TELEPHONE (OUTPATIENT)
Dept: ORTHOPEDIC SURGERY | Age: 57
End: 2022-10-27

## 2022-10-27 ENCOUNTER — TELEPHONE (OUTPATIENT)
Dept: FAMILY MEDICINE CLINIC | Age: 57
End: 2022-10-27

## 2022-10-27 NOTE — TELEPHONE ENCOUNTER
Please let her know we have one sample she can  at . This will be in insulin fridge is supply closet. Thank you!

## 2022-10-27 NOTE — TELEPHONE ENCOUNTER
Per Bill Mccullough With [address] [place] [address] for CPT codes 82985 and 41894 as they are being used for pain management.      Reference number: [id number]

## 2022-11-02 ENCOUNTER — TELEPHONE (OUTPATIENT)
Dept: FAMILY MEDICINE CLINIC | Age: 57
End: 2022-11-02

## 2022-11-02 NOTE — TELEPHONE ENCOUNTER
PT CALLING TO SEE IF SHE CAN COME AND GET SOME TRULICITY SAMPLES     PLEASE CALL PT WHEN SHE CAN  SAMPLES

## 2022-11-09 ENCOUNTER — OFFICE VISIT (OUTPATIENT)
Dept: ORTHOPEDIC SURGERY | Age: 57
End: 2022-11-09

## 2022-11-09 DIAGNOSIS — M17.11 PRIMARY OSTEOARTHRITIS OF RIGHT KNEE: Primary | ICD-10-CM

## 2022-11-09 RX ORDER — LIDOCAINE HYDROCHLORIDE 10 MG/ML
3 INJECTION, SOLUTION INFILTRATION; PERINEURAL ONCE
Status: COMPLETED | OUTPATIENT
Start: 2022-11-09 | End: 2022-11-09

## 2022-11-09 RX ADMIN — LIDOCAINE HYDROCHLORIDE 3 ML: 10 INJECTION, SOLUTION INFILTRATION; PERINEURAL at 10:26

## 2022-11-09 NOTE — PROGRESS NOTES
ULTRASOUND GUIDED GENICULATE NERVE BLOCK  Provider: MD Rah Gonzalez Mc    November 9, 2022      Chief Complaint   Patient presents with    Knee Pain     Diagnostic block, R knee. Arcadio Daniel is here for a diagnostic geniculate nerve block of her right knee. She is scheduled for this procedure to determine if she is a candidate for a Cooled Radiofrequency Ablation Procedure for her chronic knee pain. Her pain is unchanged. I have today reviewed with Rah Rodgers the clinically relevant past medical history, medications, allergies, family history, and Review of Systems from the patients most recent history form, and I have documented any details relevant to today's presenting complaints in my history above. The patient's self recorded documents concerning the above have been scanned  into the chart under the \"Media\" tab. Physical Exam:   Examination of the right knee shows crepitus with ROM, TTP along the medial and worse over the lateral condyles. No Lesion of the skin is noted over the injection sites    Impression:  right knee osteoarthritis. Plan:  Geniculate nerve block was recommended to the patient who understands that this is a trial to determine the appropriateness for Cooled Radiofrequency ablation of the knee. Procedures:  I. Inferomedial geniculate nerve block. 2. Superomedial geniculate nerve block. 3. Superolateral geniculate nerve block. Anesthesia: local    Procedure narrative:  1. The geniculate nerve block procedure for the right knee using ultrasound visualization was explained to the patient. She understands the risks and benefits of the injection procedure,  and describes no potential allergies. Time out was performed to verify correct person, correct procedure, and correct site. 2. A bolster was placed under the right knee and the knee was draped and cleansed with ChloroPrep.    3. Ultrasound visualization was first performed utilizing the AdStage e ultrasound unit using 12/4 MHz Linear Probe with sterile drape in long axes to the tibia, finding the neurovascular bundle of the inferomedial geniculate nerve at the junction of the diaphysis and metaphysis below the medial tibial plateau. The ultrasound doppler function was utilized to aid in location of the bundle. The location of the needle insertion was determined anterior to the geniculate nerve and the skin overlying the site was anesthetized with 6 seconds of ethyl chloride spray and 2ml of 1% lidocaine. After allowing time for adequate anesthesia, a 25-gauge needle was then introduced under ultrasound control to Inferomedial geniculate neurovascular bundle using in plane technique. Once the needle was in position, the nerve was injected with 1 mL of 2% Lidocaine. 4. Attention was turned to the medial aspect of the distal femur. The transducer was utilized in long axis to identify the superomedial geniculate neurovascular bundle just proximal to the adductor donna insertion into the medial femoral epicondyle at the distal femoral metaphyseal diaphyseal junction. Once the injection site was identified, and the skin overlying the site was anesthetized with 6 seconds of ethyl chloride spray and 2ml of 1% lidocaine. Then the superomedial geniculate nerve was injected with 1 ml of 2% Lidocaine using out of plane technique using a 25 gauge needle. 5. Next the attention was turned to the lateral aspect of the distal femur where the superolateral geniculate neurovascular bundle was identified using ultrasound at the distal femoral metaphyseal diaphyseal junction. The overlying skin was anesthetized using 6 seconds of ethyl chloride spray and 2ml of 1% lidocaine. Then the nerve was injected with 1 ml of 2%  Lidocaine using a 25 gauge needle using out of plane technique. 6. Bandaids were applied to the injection sites, and the patient was assisted in transfer to a chair. 7.  Chelsy tolerated the procedure well and did report 80% relief of  the right knee pain within 5 minutes of the injection. 8.  Based on the injection results, Alysa Boles is a candidate for the Cooled Radio Frequency Geniculate Nerve Ablation Procedure. We will get her scheduled for the Coolief procedure.       Rosario Lo MD  11/9/2022

## 2022-11-09 NOTE — LETTER
[facility] [address]  [address]  Phone: [phone]  Fax: [phone]    Leonardo Hawkins MD        November 9, 2022     Patient: Andreina Rm   YOB: 1965   Date of Visit: 11/9/2022       To Whom It May Concern:    Please excuse Mela Magaña from work on 11/17-11/18 for her upcoming visit. She may return to work on 11/19 if needed. If you have any questions or concerns, please don't hesitate to call.     Sincerely,        Leonardo Hawkins MD

## 2022-11-15 ENCOUNTER — TELEPHONE (OUTPATIENT)
Dept: FAMILY MEDICINE CLINIC | Age: 57
End: 2022-11-15

## 2022-11-15 NOTE — TELEPHONE ENCOUNTER
Pt calling, she is needing samples of trulicity.   Please Advise   Pt can be reached at [phone] Speaking Coherently

## 2022-11-16 NOTE — PROGRESS NOTES
[facility] ENDOSCOPY AND OUTPATIENT  PRE-PROCEDURE INSTRUCTIONS    Procedure date_11/18/2022________Arrival time___0700________        Procedure time_0800___________       Screening questions for Pain procedures:  Do you have a current infection? _no________  Are you currently taking an antibiotic?_____no_  Are you taking a blood thinner?__no______    It is not necessary to stop eating or drinking prior to this procedure. We would like you to take your medications for blood pressure as usual.  You may be asked to stop blood thinners such as Coumadin, Plavix, Fragmin, Lovenox, etc., or any anti-inflammatories such as:  Aspirin, Ibuprofen, Advil, Naproxen prior to your procedure. We also ask that you stop any OTC medications that cause additional bleeding    You must make arrangements for a responsible adult to arrive with you and stay in our waiting area during your procedure. They will also need to take you home after your procedure. For your safety you will not be allowed to leave alone or drive yourself home. Also for your safety, it is strongly suggested that someone stay with you the first 24 hours after your procedure. For your comfort, please wear simple loose fitting clothing to the center. Please do not bring valuables. If you have a living will and a durable power of  for healthcare, please bring in a copy.     You will need to bring a photo ID and insurance card    Our goal is to provide you with excellent care so if you have any questions, please contact us at the [facility] at [phone]

## 2022-11-18 ENCOUNTER — HOSPITAL ENCOUNTER (OUTPATIENT)
Age: 57
Setting detail: OUTPATIENT SURGERY
Discharge: HOME | End: 2022-11-18
Attending: STUDENT IN AN ORGANIZED HEALTH CARE EDUCATION/TRAINING PROGRAM | Admitting: STUDENT IN AN ORGANIZED HEALTH CARE EDUCATION/TRAINING PROGRAM
Payer: COMMERCIAL

## 2022-11-18 VITALS
DIASTOLIC BLOOD PRESSURE: 89 MMHG | RESPIRATION RATE: 16 BRPM | WEIGHT: 219.38 LBS | TEMPERATURE: 97.3 F | OXYGEN SATURATION: 96 % | HEART RATE: 74 BPM | SYSTOLIC BLOOD PRESSURE: 143 MMHG | HEIGHT: 64 IN | BODY MASS INDEX: 37.45 KG/M2

## 2022-11-18 PROCEDURE — 2720000010 HC SURG SUPPLY STERILE: Performed by: STUDENT IN AN ORGANIZED HEALTH CARE EDUCATION/TRAINING PROGRAM

## 2022-11-18 PROCEDURE — 2709999900 HC NON-CHARGEABLE SUPPLY: Performed by: STUDENT IN AN ORGANIZED HEALTH CARE EDUCATION/TRAINING PROGRAM

## 2022-11-18 PROCEDURE — 6360000002 HC RX W HCPCS: Performed by: STUDENT IN AN ORGANIZED HEALTH CARE EDUCATION/TRAINING PROGRAM

## 2022-11-18 PROCEDURE — 3610000057 HC PAIN LEVEL 4 ADDL 15 MIN (NON-OR): Performed by: STUDENT IN AN ORGANIZED HEALTH CARE EDUCATION/TRAINING PROGRAM

## 2022-11-18 PROCEDURE — 3610000056 HC PAIN LEVEL 4 BASE (NON-OR): Performed by: STUDENT IN AN ORGANIZED HEALTH CARE EDUCATION/TRAINING PROGRAM

## 2022-11-18 PROCEDURE — 2500000003 HC RX 250 WO HCPCS: Performed by: STUDENT IN AN ORGANIZED HEALTH CARE EDUCATION/TRAINING PROGRAM

## 2022-11-18 RX ORDER — LIDOCAINE HYDROCHLORIDE 20 MG/ML
INJECTION, SOLUTION EPIDURAL; INFILTRATION; INTRACAUDAL; PERINEURAL
Status: COMPLETED | OUTPATIENT
Start: 2022-11-18 | End: 2022-11-18

## 2022-11-18 RX ORDER — BUPIVACAINE HYDROCHLORIDE 5 MG/ML
INJECTION, SOLUTION EPIDURAL; INTRACAUDAL
Status: COMPLETED | OUTPATIENT
Start: 2022-11-18 | End: 2022-11-18

## 2022-11-18 RX ORDER — METHYLPREDNISOLONE ACETATE 40 MG/ML
INJECTION, SUSPENSION INTRA-ARTICULAR; INTRALESIONAL; INTRAMUSCULAR; SOFT TISSUE
Status: COMPLETED | OUTPATIENT
Start: 2022-11-18 | End: 2022-11-18

## 2022-11-18 ASSESSMENT — PAIN - FUNCTIONAL ASSESSMENT
PAIN_FUNCTIONAL_ASSESSMENT: 0-10
PAIN_FUNCTIONAL_ASSESSMENT: PREVENTS OR INTERFERES SOME ACTIVE ACTIVITIES AND ADLS

## 2022-11-18 ASSESSMENT — PAIN DESCRIPTION - PAIN TYPE: TYPE: ACUTE PAIN

## 2022-11-18 ASSESSMENT — PAIN DESCRIPTION - LOCATION: LOCATION: KNEE

## 2022-11-18 ASSESSMENT — PAIN DESCRIPTION - FREQUENCY: FREQUENCY: CONTINUOUS

## 2022-11-18 ASSESSMENT — PAIN DESCRIPTION - DESCRIPTORS
DESCRIPTORS: ACHING
DESCRIPTORS: DISCOMFORT

## 2022-11-18 ASSESSMENT — PAIN DESCRIPTION - ORIENTATION: ORIENTATION: RIGHT

## 2022-11-18 ASSESSMENT — PAIN DESCRIPTION - ONSET: ONSET: ON-GOING

## 2022-11-18 ASSESSMENT — PAIN SCALES - GENERAL: PAINLEVEL_OUTOF10: 5

## 2022-11-18 NOTE — OP NOTE
Operative Note      Patient: Juan Raymundo  YOB: 1965  MRN: [id number]    Date of Procedure: 11/18/2022    Pre-Op Diagnosis: Osteoarthritis of right knee,    Post-Op Diagnosis: Same       Procedure(s):  COOLIEF RADIOFREQUENCY ABLATION KNEE - RIGHT    Surgeon(s):  Nicolette Leo MD    Anesthesia: Local    Estimated Blood Loss (mL): 0 ml    Complications: None     Specimens: None    Implants: None      Drains: N/A    Procedure narrartive:  1. The geniculate nerve ablation procedure for the right knee using ultrasound visualization was explained to the patient. She understands the risks and benefits of the injection procedure,  and describes no potential allergies. Time out was performed to verify correct person, correct procedure, and correct site. 2. A bolster was placed under the  right knee and the knee was prepped with ChloroPrep and draped in a sterile manner   3. Ultrasound visualization was first performed utilizing the [employer] e ultrasound unit using 12/4 MHz linear probe with sterile drape in long axes to the tibia, finding the neurovascular bundle of the inferomedial geniculate nerve at the junction of the diaphysis and metaphysis below the medial tibial plateau. The ultrasound doppler function was utilized to aid in location of the bundle. The location of the needle insertion was determined anterior to the geniculate nerve and the site was anesthetized with 4 ml of a 50/50 mixture of 2% Lidocaine, and 0.5 % Marcaine using a 25 gauge needle. After sufficient time was allowed for anesthesia, the Coolief procedure needle was then introduced under ultrasound control to Inferomedial geniculate neurovascular bundle using in plane technique. Once the needle was in position, the radiofrequency probe was placed through the needle. Using the [facility] Unit, ablation was performed at 80 degrees for 2.5 minutes.   20 mg of Depomedrol was introduced into the site prior to removing the needle. 4. Attention was turned to the medial aspect of the distal femur. The transducer was utilized in long axis to identify the superomedial geniculate neurovascular bundle just proximal to  the adductor donna insertion into the medial femoral epicondyle at the distal femoral metaphyseal diaphyseal junction. The ultrasound doppler function was utilized to aid in location of the bundle. The location of the needle insertion was determined anterior to the geniculate nerve and the site was anesthetized with 4 ml of a 50/50 mixture of 2% Lidocaine, and 0.5 % Marcaine using a 25 gauge needle. After sufficient time was allowed for anesthesia, the Coolief procedure needle was then introduced under ultrasound control to superomedial geniculate neurovascular bundle using in plane technique. Once the needle was in position, the radiofrequency probe was placed through the needle. Using the [facility] Unit, ablation was performed at 80 degrees for 2.5 minutes. 20 mg of Depomedrol was introduced into the site prior to removing the needle. 5. Next the attention was turned to the lateral aspect of the distal femur where the superolateral geniculate neurovascular bundle was identified using ultrasound at the distal femoral metaphyseal diaphyseal junction. The ultrasound doppler function was utilized to aid in location of the bundle. The location of the needle insertion was determined anterior to the geniculate nerve and the site was anesthetized with 4 ml of a 50/50 mixture of 2% Lidocaine, and 0.5 % Marcaine using a 25 gauge needle. After sufficient time was allowed for anesthesia, the Coolief procedure needle was then introduced under ultrasound control to the superolaeral geniculate neurovascular bundle using in plane technique. Once the needle was in position, the radiofrequency probe was placed through the needle. Using the [facility] Unit, ablation was performed at 80 degrees for 2.5 minutes.  20 mg of Depomedrol was introduced into the site prior to removing the needle. 6. Bandaids were applied to the injection sites, and the patient was taken to the recovery room in satisfactory condition.      Electronically signed by Valentine Morris MD on 11/18/2022 at 7:54 AM

## 2022-11-18 NOTE — DISCHARGE INSTRUCTIONS
OUTPATIENT POST-OPERATIVE DISCHARGE INSTRUCTIONS:  [unfilled]       11/18/2022    Nely Zhong   1965    1. If you have not done so, please call Dr. Miriam Spring office to schedule your post-op appointment at [phone] in 4 weeks. 2. You may shower this evening. 3. Walking restriction: You may be full weight bearing but use cane or walker if you usually require one. 4. Activities: No vigorous activities for the first 24 hours. 5. Diet instructions: Normal diet as tolerated. 6. Medication instructions: Resume all normal medications. Use Tylenol, Advil, or Aleve for discomfort  7. Special instructions:   Rest today  Ice packs to knee for 30 minutes every 2 hours for 72 hours  Elevate knee above the heart for 72 hours  Remove bandaids within 24 hous  8. Notify your physician if you have:  Excessive bleeding, drainage, redness, or other problems at the injection sites. Persistent nausea, vomiting, or diarrhea. Hives, rash, or itching. Numbness, tingling, or increased pain. Temperature over 100 degree F. If you cannot reach your physician for any reason, please go with this paper to the nearest emergency room.

## 2022-11-18 NOTE — H&P
Patient:  Ramirez Cota  YOB: 1965  Medical Record #:  [id number]   Place: [facility]  Date:  11/18/2022   Physician:  Nakul Crockett. MD Mandy    History Obtained From: electronic medical record    HISTORY OF Jevon Mariee is here to have the Coolief procedure done for chronic right knee pain. They are not undergoing general anesthesia - we are just using local anesthesia. Denies CP, SOB. Medical history has been reviewed. Past Medical History:        Diagnosis Date    Hyperlipidemia     Hypertension     Solitary lung nodule- 5mm RML 2/14- rescan 2/15. 3/3/2014    Type II or unspecified type diabetes mellitus without mention of complication, not stated as uncontrolled      Past Surgical History:    No past surgical history on file. Medications Prior to Admission:   No current facility-administered medications on file prior to encounter. Current Outpatient Medications on File Prior to Encounter   Medication Sig Dispense Refill    aMILoride-HCTZ 5-50 MG TABS Take 1 tablet by mouth daily 90 tablet 1    diclofenac sodium (VOLTAREN) 1 % GEL APPLY 4 GRAMS TOPICALLY 4 TIMES DAILY 400 g 2    Dulaglutide 1.5 MG/0.5ML SOPN Inject 1.5 mg into the skin once a week 4 Adjustable Dose Pre-filled Pen Syringe 5    glimepiride (AMARYL) 4 MG tablet TAKE 1 TABLET BY MOUTH ONCE DAILY IN THE MORNING BEFORE BREAKFAST 90 tablet 1    Insulin Degludec (TRESIBA FLEXTOUCH) 200 UNIT/ML SOPN Take 32 units daily.  6 Adjustable Dose Pre-filled Pen Syringe 1    empagliflozin (JARDIANCE) 25 MG tablet Take 1 tablet by mouth daily 90 tablet 1    losartan (COZAAR) 100 MG tablet Take 1 tablet by mouth once daily 90 tablet 1    lovastatin (MEVACOR) 40 MG tablet Take 1 tablet by mouth once daily 90 tablet 1    metFORMIN (GLUCOPHAGE) 1000 MG tablet TAKE 1 TABLET BY MOUTH TWICE DAILY WITH MEALS 180 tablet 1    metoprolol succinate (TOPROL XL) 25 MG extended release tablet TAKE 1 TABLET BY MOUTH EVERY DAY 90 tablet 1    Insulin Pen Needle (B-D UF III MINI PEN NEEDLES) 31G X 5 MM MISC USE 1  UNDER THE SKIN DAILY 100 each 2    Multiple Vitamins-Minerals (CENTRUM PO) Take  by mouth daily. Allergies:  Ppd  Social History     Socioeconomic History    Marital status:      Spouse name: Hollie Singh    Number of children: 2    Years of education: Not on file    Highest education level: Not on file   Occupational History    Occupation: [employer] at [employer]   Tobacco Use    Smoking status: Never    Smokeless tobacco: Never   Vaping Use    Vaping Use: Never used   Substance and Sexual Activity    Alcohol use: No    Drug use: No    Sexual activity: Yes     Partners: Male     Comment:    Other Topics Concern    Not on file   Social History Narrative    Not on file     Social Determinants of Health     Financial Resource Strain: Low Risk     Difficulty of Paying Living Expenses: Not hard at all   Food Insecurity: No Food Insecurity    Worried About Running Out of Food in the Last Year: Never true    Ran Out of Food in the Last Year: Never true   Transportation Needs: Not on file   Physical Activity: Not on file   Stress: Not on file   Social Connections: Not on file   Intimate Partner Violence: Not on file   Housing Stability: Not on file     Family History   Problem Relation Age of Onset    High Blood Pressure Father      Review of System:   I have today reviewed with Denise Bishop the clinically relevant past medical history, medications, allergies, family history, and Review of Systems from the patients most recent history form, and I have documented any details relevant to today's presenting complaints in my history above. The patient's self recorded documents concerning the above have been scanned  into the chart under the \"Media\" tab.     PHYSICAL EXAM:      Ht 5' 4\" (1.626 m)   Wt 230 lb (104.3 kg)   LMP 09/17/2012   BMI 39.48 kg/m²  I    General Appearance: no acute distress,

## 2022-12-07 ENCOUNTER — TELEPHONE (OUTPATIENT)
Dept: ORTHOPEDIC SURGERY | Age: 57
End: 2022-12-07

## 2022-12-07 NOTE — TELEPHONE ENCOUNTER
Call patient to schedule her for a follow up for the Coolief procedure. We are currently looking at December 21st.     *CANNOT SCHEDULE EARLIER THAN December 19TH DUE TO INSURANCE. *

## 2022-12-08 ENCOUNTER — COMMUNITY OUTREACH (OUTPATIENT)
Dept: FAMILY MEDICINE CLINIC | Age: 57
End: 2022-12-08

## 2022-12-08 NOTE — PROGRESS NOTES
Patient's HM shows they are overdue for Mammogram and Colorectal Screening. Care Everywhere and  files searched. No results to attach to order nor HM updated.

## 2022-12-12 NOTE — TELEPHONE ENCOUNTER
Appointment Request     Patient requesting earlier appointment: [address]  Appointment offered to patient: [phone]  Patient Contact Number: [phone]     Pt [place]. PLEASE CALL HER WITH THE CORRECT TIME.

## 2022-12-13 DIAGNOSIS — E11.21 TYPE 2 DIABETES MELLITUS WITH DIABETIC NEPHROPATHY, WITHOUT LONG-TERM CURRENT USE OF INSULIN (HCC): ICD-10-CM

## 2022-12-14 RX ORDER — EMPAGLIFLOZIN 25 MG/1
TABLET, FILM COATED ORAL
Qty: 90 TABLET | Refills: 1 | Status: SHIPPED | OUTPATIENT
Start: 2022-12-14

## 2022-12-21 ENCOUNTER — OFFICE VISIT (OUTPATIENT)
Dept: ORTHOPEDIC SURGERY | Age: 57
End: 2022-12-21

## 2022-12-21 DIAGNOSIS — M17.11 PRIMARY OSTEOARTHRITIS OF RIGHT KNEE: Primary | ICD-10-CM

## 2022-12-21 NOTE — PROGRESS NOTES
Chief Complaint   Patient presents with    Follow-up     Coolief follow up. R knee. Feeling better than before, but not 100%. HPI:      Santosh Talavera is a 61 y.o. female who presents for follow-up evaluation of right knee pain/OA. A little over 1 month ago we performed the Coolief procedure on her right knee. She comes in today for follow-up. She reports that her symptoms are approximately 70% better and she is happy with those results. She does state that she has a little tenderness over the injection sites still, but otherwise feels well. Past Medical History:   Diagnosis Date    Hyperlipidemia     Hypertension     Solitary lung nodule- 5mm RML 2/14- rescan 2/15. 3/3/2014    Type II or unspecified type diabetes mellitus without mention of complication, not stated as uncontrolled        Medication  Current Outpatient Medications   Medication Sig Dispense Refill    JARDIANCE 25 MG tablet TAKE 1 TABLET BY MOUTH DAILY 90 tablet 1    aMILoride-HCTZ 5-50 MG TABS Take 1 tablet by mouth daily 90 tablet 1    diclofenac sodium (VOLTAREN) 1 % GEL APPLY 4 GRAMS TOPICALLY 4 TIMES DAILY 400 g 2    Dulaglutide 1.5 MG/0.5ML SOPN Inject 1.5 mg into the skin once a week 4 Adjustable Dose Pre-filled Pen Syringe 5    glimepiride (AMARYL) 4 MG tablet TAKE 1 TABLET BY MOUTH ONCE DAILY IN THE MORNING BEFORE BREAKFAST 90 tablet 1    Insulin Degludec (TRESIBA FLEXTOUCH) 200 UNIT/ML SOPN Take 32 units daily.  6 Adjustable Dose Pre-filled Pen Syringe 1    losartan (COZAAR) 100 MG tablet Take 1 tablet by mouth once daily 90 tablet 1    lovastatin (MEVACOR) 40 MG tablet Take 1 tablet by mouth once daily 90 tablet 1    metFORMIN (GLUCOPHAGE) 1000 MG tablet TAKE 1 TABLET BY MOUTH TWICE DAILY WITH MEALS 180 tablet 1    metoprolol succinate (TOPROL XL) 25 MG extended release tablet TAKE 1 TABLET BY MOUTH EVERY DAY 90 tablet 1    Insulin Pen Needle (B-D UF III MINI PEN NEEDLES) 31G X 5 MM MISC USE 1  UNDER THE SKIN DAILY 100 each 2 Multiple Vitamins-Minerals (CENTRUM PO) Take  by mouth daily. No current facility-administered medications for this visit. Allergies  Allergies   Allergen Reactions    Ppd        Review of Systems:  Pertinent items are noted in HPI. Physical Examination: This is a pleasant female, alert, and in no acute distress. There were no vitals filed for this visit. Right knee exam: She does have small marks on her skin from the injection sites, but the skin is well-healed. Mild tenderness over injection sites. Less joint line tenderness than previously. Vascular exam: Extremities warm and well perfused, no significant edema    Respiratory exam: Breathing easy and unlabored    Neuro exam: No focal neuro deficits    Lymphatic: No obvious lymphadenopathy    Skin: Warm, dry      Assessment and Plan:     1. Primary osteoarthritis of right knee  She did have good symptom relief from the procedure although it is not 100%. She is happy with her results. We did discuss if this is something that she felt she needed on the left knee, but it sounds like her pain is not bad enough yet on that side. She should continue steroid and/or gel injections if those have continued to work for her. All of her questions have been answered at this time. Follow-up: as needed. Sooner with any problems, questions, concerns, or worsening symptoms. Kevin Villegas MD  Primary Care Sports Medicine    Please note that this chart was at least partially generated using dragon dictation software. Although every effort was made to ensure the accuracy of this automated transcription, some errors in transcription may have occurred.

## 2023-01-03 ENCOUNTER — HOSPITAL ENCOUNTER (EMERGENCY)
Age: 58
Discharge: HOME | End: 2023-01-03
Attending: EMERGENCY MEDICINE
Payer: COMMERCIAL

## 2023-01-03 ENCOUNTER — APPOINTMENT (OUTPATIENT)
Dept: GENERAL RADIOLOGY | Age: 58
End: 2023-01-03
Payer: COMMERCIAL

## 2023-01-03 ENCOUNTER — APPOINTMENT (OUTPATIENT)
Dept: CT IMAGING | Age: 58
End: 2023-01-03
Payer: COMMERCIAL

## 2023-01-03 VITALS
DIASTOLIC BLOOD PRESSURE: 89 MMHG | OXYGEN SATURATION: 98 % | HEART RATE: 73 BPM | SYSTOLIC BLOOD PRESSURE: 158 MMHG | HEIGHT: 64 IN | TEMPERATURE: 97.6 F | RESPIRATION RATE: 18 BRPM | WEIGHT: 222 LBS | BODY MASS INDEX: 37.9 KG/M2

## 2023-01-03 DIAGNOSIS — T07.XXXA MULTIPLE CONTUSIONS: ICD-10-CM

## 2023-01-03 DIAGNOSIS — S06.9X1A HEAD INJURY, CLOSED, WITH BRIEF LOC (HCC): ICD-10-CM
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
## 2023-01-03 PROCEDURE — 71250 CT THORAX DX C-: CPT

## 2023-01-03 PROCEDURE — 73610 X-RAY EXAM OF ANKLE: CPT

## 2023-01-03 PROCEDURE — 72125 CT NECK SPINE W/O DYE: CPT

## 2023-01-03 PROCEDURE — 73130 X-RAY EXAM OF HAND: CPT

## 2023-01-03 PROCEDURE — 99284 EMERGENCY DEPT VISIT MOD MDM: CPT

## 2023-01-03 PROCEDURE — 6370000000 HC RX 637 (ALT 250 FOR IP)

## 2023-01-03 PROCEDURE — 73560 X-RAY EXAM OF KNEE 1 OR 2: CPT

## 2023-01-03 PROCEDURE — 73080 X-RAY EXAM OF ELBOW: CPT

## 2023-01-03 PROCEDURE — 70450 CT HEAD/BRAIN W/O DYE: CPT

## 2023-01-03 RX ORDER — ACETAMINOPHEN 500 MG
1000 TABLET ORAL ONCE
Status: COMPLETED | OUTPATIENT
Start: 2023-01-03 | End: 2023-01-03

## 2023-01-03 RX ADMIN — ACETAMINOPHEN 1000 MG: 500 TABLET ORAL at 09:27

## 2023-01-03 ASSESSMENT — PAIN DESCRIPTION - ORIENTATION: ORIENTATION: LEFT

## 2023-01-03 ASSESSMENT — ENCOUNTER SYMPTOMS
EYE PAIN: 0
SORE THROAT: 0
NAUSEA: 0
DIARRHEA: 0
SHORTNESS OF BREATH: 0
COUGH: 0
BACK PAIN: 1
ABDOMINAL PAIN: 0
VOMITING: 0
CONSTIPATION: 0
RHINORRHEA: 0

## 2023-01-03 ASSESSMENT — PAIN DESCRIPTION - PAIN TYPE: TYPE: ACUTE PAIN

## 2023-01-03 ASSESSMENT — PAIN - FUNCTIONAL ASSESSMENT
PAIN_FUNCTIONAL_ASSESSMENT: NONE - DENIES PAIN
PAIN_FUNCTIONAL_ASSESSMENT: 0-10

## 2023-01-03 ASSESSMENT — PAIN SCALES - GENERAL: PAINLEVEL_OUTOF10: 9

## 2023-01-03 ASSESSMENT — PAIN DESCRIPTION - FREQUENCY: FREQUENCY: CONTINUOUS

## 2023-01-03 ASSESSMENT — PAIN DESCRIPTION - LOCATION: LOCATION: BACK;HAND;LEG

## 2023-01-03 NOTE — ED TRIAGE NOTES
Pt fell on back and left hand around 545 this morning. Pt tripped over cot at place of work. C/o pain in mid/upper back that radiates into chest.  Left hand and leg pain.

## 2023-01-03 NOTE — DISCHARGE INSTRUCTIONS
Please make sure you follow-up with your family doctor as well as [employer] regarding your work-related injury.   Return to the ED for any worsening symptoms as we discussed

## 2023-01-03 NOTE — Clinical Note
Kinjal Donnelly was seen and treated in our emergency department on 1/3/2023. She may return to work on 01/05/2023. If you have any questions or concerns, please don't hesitate to call.       Ashley Macias MD

## 2023-01-03 NOTE — ED PROVIDER NOTES
[address]        Pt Name: Aliya Cisse  MRN: [id number]  [place] 1965  Date of evaluation: 1/3/2023  Provider: FREDY Goode - KEN  PCP: Cadence Issa MD  Note Started: 10:45 AM EST 1/3/23      AYAD. I have evaluated this patient. My supervising physician was available for consultation. CHIEF COMPLAINT       Chief Complaint   Patient presents with    Fall     Pt fell on back and left hand around 545 this morning. Pt tripped over cot at place of work. C/o pain in mid/upper back that radiates into chest.  Left hand and leg pain. HISTORY OF PRESENT ILLNESS: 1 or more Elements     History From: The patient  Limitations to history : Pepe Cisse is a 61 y.o. female who presents to the ED after fall at work. This occurred at 5:45 AM, roughly 4 hours before arrival to ED. 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 She did hit her head. She is not anticoagulated. She reports a brief episode of LOC. Reportedly a few seconds. No incontinence. No anticoagulation    She also hurts on her neck, T-spine, left elbow, left hand. Nursing Notes were all reviewed and agreed with or any disagreements were addressed in the HPI. REVIEW OF SYSTEMS :      Review of Systems   Constitutional:  Negative for chills, diaphoresis and fever. HENT:  Negative for congestion, rhinorrhea and sore throat. Eyes:  Negative for pain and visual disturbance. Respiratory:  Negative for cough and shortness of breath. Cardiovascular:  Negative for chest pain and leg swelling. Gastrointestinal:  Negative for abdominal pain, constipation, diarrhea, nausea and vomiting. Genitourinary:  Negative for frequency and hematuria.    Musculoskeletal:  Positive for back pain and neck pain. Skin:  Negative for rash and wound. Neurological:  Negative for dizziness, light-headedness, numbness and headaches. Positives and Pertinent negatives as per HPI. SURGICAL HISTORY     Past Surgical History:   Procedure Laterality Date    NERVE SURGERY Right 11/18/2022    COOLIEF RADIOFREQUENCY ABLATION KNEE - RIGHT performed by Eloisa Hendricks MD at [facility]       Previous Medications    AMILORIDE-HCTZ 5-50 MG TABS    Take 1 tablet by mouth daily    DICLOFENAC SODIUM (VOLTAREN) 1 % GEL    APPLY 4 GRAMS TOPICALLY 4 TIMES DAILY    DULAGLUTIDE 1.5 MG/0.5ML SOPN    Inject 1.5 mg into the skin once a week    GLIMEPIRIDE (AMARYL) 4 MG TABLET    TAKE 1 TABLET BY MOUTH ONCE DAILY IN THE MORNING BEFORE BREAKFAST    INSULIN DEGLUDEC (TRESIBA FLEXTOUCH) 200 UNIT/ML SOPN    Take 32 units daily. INSULIN PEN NEEDLE (B-D UF III MINI PEN NEEDLES) 31G X 5 MM MISC    USE 1  UNDER THE SKIN DAILY    JARDIANCE 25 MG TABLET    TAKE 1 TABLET BY MOUTH DAILY    LOSARTAN (COZAAR) 100 MG TABLET    Take 1 tablet by mouth once daily    LOVASTATIN (MEVACOR) 40 MG TABLET    Take 1 tablet by mouth once daily    METFORMIN (GLUCOPHAGE) 1000 MG TABLET    TAKE 1 TABLET BY MOUTH TWICE DAILY WITH MEALS    METOPROLOL SUCCINATE (TOPROL XL) 25 MG EXTENDED RELEASE TABLET    TAKE 1 TABLET BY MOUTH EVERY DAY    MULTIPLE VITAMINS-MINERALS (CENTRUM PO)    Take  by mouth daily.          ALLERGIES     Ppd    FAMILYHISTORY       Family History   Problem Relation Age of Onset    High Blood Pressure Father         SOCIAL HISTORY       Social History     Tobacco Use    Smoking status: Never    Smokeless tobacco: Never   Vaping Use    Vaping Use: Never used   Substance Use Topics    Alcohol use: No    Drug use: No       SCREENINGS        [place] Coma Scale  Eye Opening: Spontaneous  Best Verbal Response: Oriented  Best Motor Response: Obeys commands  [place] Coma Scale Score: 15 [place] Assessment  BP: (!) 158/89  Heart Rate: 73           PHYSICAL EXAM  1 or more Elements     ED Triage Vitals [01/03/23 0856]   BP Temp Temp Source Heart Rate Resp SpO2 Height Weight   (!) 158/89 97.6 °F (36.4 °C) Oral 73 18 98 % 5' 4\" (1.626 m) 222 lb 0.1 oz (100.7 kg)       Physical Exam  Vitals and nursing note reviewed. Constitutional:       Appearance: Normal appearance. She is obese. She is not ill-appearing, toxic-appearing or diaphoretic. HENT:      Head: Normocephalic and atraumatic. Right Ear: External ear normal.      Left Ear: External ear normal.      Nose: Nose normal. No congestion or rhinorrhea. Mouth/Throat:      Mouth: Mucous membranes are moist.      Pharynx: Oropharynx is clear. No oropharyngeal exudate or posterior oropharyngeal erythema. Eyes:      General: No scleral icterus. Right eye: No discharge. Left eye: No discharge. Extraocular Movements: Extraocular movements intact. Conjunctiva/sclera: Conjunctivae normal.      Pupils: Pupils are equal, round, and reactive to light. Cardiovascular:      Rate and Rhythm: Normal rate and regular rhythm. Pulses: Normal pulses. Heart sounds: Normal heart sounds. No murmur heard. No friction rub. No gallop. Pulmonary:      Effort: Pulmonary effort is normal. No respiratory distress. Breath sounds: Normal breath sounds. No stridor. No wheezing, rhonchi or rales. Chest:      Chest wall: Tenderness present. Abdominal:      General: Abdomen is flat. Bowel sounds are normal. There is no distension. Palpations: Abdomen is soft. Tenderness: There is no abdominal tenderness. There is no right CVA tenderness, left CVA tenderness or guarding. Musculoskeletal:         General: Normal range of motion. Cervical back: Normal range of motion and neck supple. Tenderness present. No rigidity.       Comments: She is tender palpation of the entire T-spine, the anterior chest wall on the left side, left shoulder, left elbow, left hand. She is neurovascular intact distally. Skin:     General: Skin is warm and dry. Capillary Refill: Capillary refill takes less than 2 seconds. Coloration: Skin is not jaundiced or pale. Findings: No rash. Neurological:      General: No focal deficit present. Mental Status: She is alert and oriented to person, place, and time. Mental status is at baseline. Cranial Nerves: No cranial nerve deficit. Motor: No weakness. Gait: Gait normal.   Psychiatric:         Mood and Affect: Mood normal.         Behavior: Behavior normal.         DIAGNOSTIC RESULTS   LABS:    Labs Reviewed - No data to display    When ordered only abnormal lab results are displayed. All other labs were within normal range or not returned as of this dictation. EKG: When ordered, EKG's are interpreted by the Emergency Department Physician in the absence of a cardiologist.  Please see their note for interpretation of EKG. RADIOLOGY:   Non-plain film images such as CT, Ultrasound and MRI are read by the radiologist. Plain radiographic images are visualized and preliminarily interpreted by the ED Provider with the below findings:      Interpretation per the Radiologist below, if available at the time of this note:    XR ANKLE LEFT (MIN 3 VIEWS)   Final Result   1. No acute findings of the left knee. 2.  Soft tissue swelling over the lateral malleolus without evidence of   underlying fracture. XR ELBOW LEFT (MIN 3 VIEWS)   Final Result   No acute findings         XR HAND LEFT (MIN 3 VIEWS)   Final Result   No fracture or dislocation         XR KNEE LEFT (1-2 VIEWS)   Final Result   1. No acute findings of the left knee. 2.  Soft tissue swelling over the lateral malleolus without evidence of   underlying fracture. CT CERVICAL SPINE WO CONTRAST   Final Result   No acute abnormality of the cervical spine.          CT HEAD WO CONTRAST   Final Result   No acute intracranial abnormality. CT CHEST WO CONTRAST   Final Result   Negative noncontrast CT of the chest.           XR ELBOW LEFT (MIN 3 VIEWS)    Result Date: 1/3/2023  EXAMINATION: THREE XRAY VIEWS OF THE LEFT ELBOW 1/3/2023 10:05 am COMPARISON: None. HISTORY: ORDERING SYSTEM PROVIDED HISTORY: injury TECHNOLOGIST PROVIDED HISTORY: Reason for exam:->injury Reason for Exam: fall FINDINGS: No fracture or dislocation. No joint effusion. No acute findings     XR HAND LEFT (MIN 3 VIEWS)    Result Date: 1/3/2023  EXAMINATION: THREE XRAY VIEWS OF THE LEFT HAND 1/3/2023 10:05 am COMPARISON: None. HISTORY: ORDERING SYSTEM PROVIDED HISTORY: fall TECHNOLOGIST PROVIDED HISTORY: Reason for exam:->fall Reason for Exam: fall FINDINGS: No fracture or dislocation. No fracture or dislocation     XR KNEE LEFT (1-2 VIEWS)    Result Date: 1/3/2023  EXAMINATION: THREE XRAY VIEWS OF THE LEFT ANKLE;   XRAY VIEWS OF THE LEFT KNEE 1/3/2023 10:06 am COMPARISON: None. HISTORY: ORDERING SYSTEM PROVIDED HISTORY: fall TECHNOLOGIST PROVIDED HISTORY: Reason for exam:->fall Reason for Exam: fall FINDINGS: Left knee: No fracture or dislocation. No joint effusion. Left ankle: There is soft tissue swelling over the lateral malleolus. No fracture is seen. The talar dome is smooth. 1.  No acute findings of the left knee. 2.  Soft tissue swelling over the lateral malleolus without evidence of underlying fracture. XR ANKLE LEFT (MIN 3 VIEWS)    Result Date: 1/3/2023  EXAMINATION: THREE XRAY VIEWS OF THE LEFT ANKLE;   XRAY VIEWS OF THE LEFT KNEE 1/3/2023 10:06 am COMPARISON: None. HISTORY: ORDERING SYSTEM PROVIDED HISTORY: fall TECHNOLOGIST PROVIDED HISTORY: Reason for exam:->fall Reason for Exam: fall FINDINGS: Left knee: No fracture or dislocation. No joint effusion. Left ankle: There is soft tissue swelling over the lateral malleolus. No fracture is seen. The talar dome is smooth.      1.  No acute findings of the left knee. 2.  Soft tissue swelling over the lateral malleolus without evidence of underlying fracture. CT HEAD WO CONTRAST    Result Date: 1/3/2023  EXAMINATION: CT OF THE HEAD WITHOUT CONTRAST  1/3/2023 9:44 am TECHNIQUE: CT of the head was performed without the administration of intravenous contrast. Automated exposure control, iterative reconstruction, and/or weight based adjustment of the mA/kV was utilized to reduce the radiation dose to as low as reasonably achievable. COMPARISON: None. HISTORY: ORDERING SYSTEM PROVIDED HISTORY: fall TECHNOLOGIST PROVIDED HISTORY: Has a \"code stroke\" or \"stroke alert\" been called? ->No Reason for exam:->fall Decision Support Exception - unselect if not a suspected or confirmed emergency medical condition->Emergency Medical Condition (MA) FINDINGS: BRAIN/VENTRICLES: There is no acute intracranial hemorrhage, mass effect or midline shift. No abnormal extra-axial fluid collection. The gray-white differentiation is maintained without evidence of an acute infarct. There is no evidence of hydrocephalus. ORBITS: The visualized portion of the orbits demonstrate no acute abnormality. SINUSES: The visualized paranasal sinuses and mastoid air cells demonstrate no acute abnormality. SOFT TISSUES/SKULL:  No acute abnormality of the visualized skull or soft tissues. No acute intracranial abnormality. CT CHEST WO CONTRAST    Result Date: 1/3/2023  EXAMINATION: CT OF THE CHEST WITHOUT CONTRAST 1/3/2023 9:44 am TECHNIQUE: CT of the chest was performed without the administration of intravenous contrast. Multiplanar reformatted images are provided for review. Automated exposure control, iterative reconstruction, and/or weight based adjustment of the mA/kV was utilized to reduce the radiation dose to as low as reasonably achievable. COMPARISON: None.  HISTORY: ORDERING SYSTEM PROVIDED HISTORY: fall, pain, t spine, anterior chest wall, left shoulder TECHNOLOGIST PROVIDED HISTORY: Reason for exam:->fall, pain, t spine, anterior chest wall, left shoulder Decision Support Exception - unselect if not a suspected or confirmed emergency medical condition->Emergency Medical Condition (MA) FINDINGS: Mediastinum: The central airways are patent. There is no hilar or mediastinal adenopathy. Lungs/pleura: There is no pneumothorax, consolidation or effusion. Upper Abdomen: The visualized solid abdominal organs are unremarkable. Soft Tissues/Bones: There is no acute fracture or aggressive osseous lesion. Chronic healed fractures of the left anterior 5th and 6th ribs are noted. Negative noncontrast CT of the chest.     CT CERVICAL SPINE WO CONTRAST    Result Date: 1/3/2023  EXAMINATION: CT OF THE CERVICAL SPINE WITHOUT CONTRAST 1/3/2023 9:44 am TECHNIQUE: CT of the cervical spine was performed without the administration of intravenous contrast. Multiplanar reformatted images are provided for review. Automated exposure control, iterative reconstruction, and/or weight based adjustment of the mA/kV was utilized to reduce the radiation dose to as low as reasonably achievable. COMPARISON: None. HISTORY: ORDERING SYSTEM PROVIDED HISTORY: fall TECHNOLOGIST PROVIDED HISTORY: Reason for exam:->fall Decision Support Exception - unselect if not a suspected or confirmed emergency medical condition->Emergency Medical Condition (MA) FINDINGS: BONES/ALIGNMENT: There is no acute fracture or traumatic malalignment. DEGENERATIVE CHANGES: There is mild disc space narrowing and anterior osteophyte formation at the C4/5 down through the C6/7 levels. SOFT TISSUES: There is no prevertebral soft tissue swelling. No acute abnormality of the cervical spine. No results found.     PROCEDURES   Unless otherwise noted below, none     Procedures    CRITICAL CARE TIME (.cctime)   Haily ANTHONY CNP, am the primary clinician of record  I provided 5 minutes of non concurrent Critical Care time, excluding separately reportable procedures. There was a high probability of clinically significant/life threatening deterioration in the patient's condition which required my urgent intervention. This time spent assessing, reassessing patient, chart review and discussing case with other providers      PAST MEDICAL HISTORY      has a past medical history of Hyperlipidemia, Hypertension, Solitary lung nodule- 5mm RML 2/14- rescan 2/15. (3/3/2014), and Type II or unspecified type diabetes mellitus without mention of complication, not stated as uncontrolled. EMERGENCY DEPARTMENT COURSE and DIFFERENTIAL DIAGNOSIS/MDM:   Vitals:    Vitals:    01/03/23 0856   BP: (!) 158/89   Pulse: 73   Resp: 18   Temp: 97.6 °F (36.4 °C)   TempSrc: Oral   SpO2: 98%   Weight: 222 lb 0.1 oz (100.7 kg)   Height: 5' 4\" (1.626 m)       Patient was given the following medications:  Medications   acetaminophen (TYLENOL) tablet 1,000 mg (1,000 mg Oral Given 1/3/23 8520)             Is this patient to be included in the SEP-1 Core Measure due to severe sepsis or septic shock? No   Exclusion criteria - the patient is NOT to be included for SEP-1 Core Measure due to: Infection is not suspected    Chronic Conditions affecting care:    has a past medical history of Hyperlipidemia, Hypertension, Solitary lung nodule- 5mm RML 2/14- rescan 2/15. (3/3/2014), and Type II or unspecified type diabetes mellitus without mention of complication, not stated as uncontrolled. CONSULTS: (Who and What was discussed)  None          Records Reviewed (Source):     CC/HPI Summary, DDx, ED Course, and Reassessment: 48-year-old female presents ED after fall as above    Differential Diagnosis: epidural hematoma, subdural hematoma, parenchymal brain contusion or bleed, subarachnoid hemorrhage, skull fracture, neck fracture or dislocation, other. Given her LOC, pain as well as multiple images there was concern for chest CT scan was ordered.   CT scan of her head, C-spine and chest was ordered. Plain films of the extremities were also ordered. Results are as above. No acute fracture. PE as above. No evidence of acute fracture, compartment syndrome, neurovascular compromise or procedure requiring immediate surgical intervention. It is understood that other occult fractures, ligament injury, tendon injury, cartilage injury, and joint injury have been considered and cannot be completely excluded. It is understood that if the patient is not improving as expected or if other new symptoms or signs of concern develop, other etiologies or diagnoses may need to be considered requiring other tests, treatments, consultations, and/or admission. The diagnosis, plan, expected course, follow-up, and return precautions were discussed and all questions were answered. I will attempt to control the patient's pain. Disposition Considerations (tests considered but not done, Admit vs D/C, Shared Decision Making, Pt Expectation of Test or Tx.): Findings are discussed the patient. Plan is for a few days off work, follow-up with PCP and [facility] for work-related injury. Patient agreeable with this plan    The patient is at low risk for mortality based on demographic, history and clinical factors. Given the best available information and clinical assessment, I estimate the risk of hospitalization to be greater than risk of treatment at home. I have explained to the patient that the risk could rapidly change, given precautions for return and instructions. Explained to patient that the risk for mortality is low based on demographic, history and clinical factors. I discussed with patient the results of evaluation in the ED, diagnosis, care, and prognosis. The plan is to discharge to home. Patient is in agreement with plan and questions have been answered. I also discussed with patient the reasons which may require a return visit and the importance of follow-up care.   The patient is well-appearing, nontoxic, and improved at the time of discharge. Patient agrees to call to arrange follow-up care as directed. Patient understands to return immediately for worsening/change in symptoms. I am the Primary Clinician of Record. FINAL IMPRESSION      1. Work related injury    2. Head injury, closed, with brief LOC ([facility] [address]    3. Fall, initial encounter    4. Multiple contusions          DISPOSITION/PLAN     DISPOSITION Decision To Discharge 01/03/2023 10:42:48 AM      PATIENT REFERRED TO:  Patricia Frye MD  [address] [place] [address] Drive  [address] [place] [address]  [phone]    Call in 2 days  Follow up on your recent ED visit    [address] [place] [address]  [address] [address] [facility] [address] [phone]    Call in 1 day  For your Worker's Comp.  injury      DISCHARGE MEDICATIONS:  New Prescriptions    No medications on file       DISCONTINUED MEDICATIONS:  Discontinued Medications    No medications on file              (Please note that portions of this note were completed with a voice recognition program.  Efforts were made to edit the dictations but occasionally words are mis-transcribed.)    FREDY Persaud CNP (electronically signed)       FREDY Persaud CNP  01/03/23 5271

## 2023-01-11 ENCOUNTER — TELEPHONE (OUTPATIENT)
Dept: FAMILY MEDICINE CLINIC | Age: 58
End: 2023-01-11

## 2023-01-11 NOTE — TELEPHONE ENCOUNTER
Pt calling in she would like to know if she can get samples of 1.5 Trulicity.     Please Advise  Pt can be reached at [phone]

## 2023-01-23 ENCOUNTER — TELEPHONE (OUTPATIENT)
Dept: FAMILY MEDICINE CLINIC | Age: 58
End: 2023-01-23

## 2023-01-23 NOTE — TELEPHONE ENCOUNTER
Pt calling in and would like to get Trulicity and Tresiba samples.     Please Advise  Pt can be reached at [phone]

## 2023-01-26 ENCOUNTER — TELEPHONE (OUTPATIENT)
Dept: FAMILY MEDICINE CLINIC | Age: 58
End: 2023-01-26

## 2023-01-26 NOTE — TELEPHONE ENCOUNTER
[facility] calling in regards to the trulicity 4.5GL  This medication needs a PA  [facility] said that they sent it through [employer] [facility] and sent a fax to us for this medication     Please advise

## 2023-02-22 ENCOUNTER — TELEPHONE (OUTPATIENT)
Dept: FAMILY MEDICINE CLINIC | Age: 58
End: 2023-02-22

## 2023-02-22 NOTE — TELEPHONE ENCOUNTER
Pt calling in to see if she can get trulicity samples.     Please Advise  Pt can be reached at [phone]

## 2023-03-07 ENCOUNTER — OFFICE VISIT (OUTPATIENT)
Dept: FAMILY MEDICINE CLINIC | Age: 58
End: 2023-03-07
Payer: COMMERCIAL

## 2023-03-07 VITALS
HEART RATE: 87 BPM | SYSTOLIC BLOOD PRESSURE: 136 MMHG | OXYGEN SATURATION: 100 % | HEIGHT: 64 IN | WEIGHT: 218.8 LBS | RESPIRATION RATE: 16 BRPM | DIASTOLIC BLOOD PRESSURE: 86 MMHG | BODY MASS INDEX: 37.36 KG/M2

## 2023-03-07 DIAGNOSIS — M17.0 PRIMARY OSTEOARTHRITIS OF BOTH KNEES: ICD-10-CM

## 2023-03-07 DIAGNOSIS — E11.21 TYPE 2 DIABETES MELLITUS WITH DIABETIC NEPHROPATHY, WITH LONG-TERM CURRENT USE OF INSULIN (HCC): Primary | ICD-10-CM

## 2023-03-07 DIAGNOSIS — Z12.11 SCREEN FOR COLON CANCER: ICD-10-CM

## 2023-03-07 DIAGNOSIS — Z12.31 SCREENING MAMMOGRAM FOR BREAST CANCER: ICD-10-CM

## 2023-03-07 DIAGNOSIS — I10 PRIMARY HYPERTENSION: ICD-10-CM

## 2023-03-07 DIAGNOSIS — Z79.4 TYPE 2 DIABETES MELLITUS WITH DIABETIC NEPHROPATHY, WITH LONG-TERM CURRENT USE OF INSULIN (HCC): Primary | ICD-10-CM

## 2023-03-07 LAB — HBA1C MFR BLD: 7.7 %

## 2023-03-07 PROCEDURE — 3078F DIAST BP <80 MM HG: CPT | Performed by: FAMILY MEDICINE

## 2023-03-07 PROCEDURE — 83036 HEMOGLOBIN GLYCOSYLATED A1C: CPT | Performed by: FAMILY MEDICINE

## 2023-03-07 PROCEDURE — 99214 OFFICE O/P EST MOD 30 MIN: CPT | Performed by: FAMILY MEDICINE

## 2023-03-07 PROCEDURE — 3074F SYST BP LT 130 MM HG: CPT | Performed by: FAMILY MEDICINE

## 2023-03-07 PROCEDURE — 3051F HG A1C>EQUAL 7.0%<8.0%: CPT | Performed by: FAMILY MEDICINE

## 2023-03-07 RX ORDER — INSULIN DEGLUDEC 200 U/ML
INJECTION, SOLUTION SUBCUTANEOUS
Qty: 6 ADJUSTABLE DOSE PRE-FILLED PEN SYRINGE | Refills: 1 | Status: SHIPPED | OUTPATIENT
Start: 2023-03-07

## 2023-03-07 SDOH — ECONOMIC STABILITY: FOOD INSECURITY: WITHIN THE PAST 12 MONTHS, YOU WORRIED THAT YOUR FOOD WOULD RUN OUT BEFORE YOU GOT MONEY TO BUY MORE.: NEVER TRUE

## 2023-03-07 SDOH — ECONOMIC STABILITY: FOOD INSECURITY: WITHIN THE PAST 12 MONTHS, THE FOOD YOU BOUGHT JUST DIDN'T LAST AND YOU DIDN'T HAVE MONEY TO GET MORE.: NEVER TRUE

## 2023-03-07 SDOH — ECONOMIC STABILITY: HOUSING INSECURITY
IN THE LAST 12 MONTHS, WAS THERE A TIME WHEN YOU DID NOT HAVE A STEADY PLACE TO SLEEP OR SLEPT IN A SHELTER (INCLUDING NOW)?: NO

## 2023-03-07 SDOH — ECONOMIC STABILITY: INCOME INSECURITY: HOW HARD IS IT FOR YOU TO PAY FOR THE VERY BASICS LIKE FOOD, HOUSING, MEDICAL CARE, AND HEATING?: NOT HARD AT ALL

## 2023-03-07 ASSESSMENT — PATIENT HEALTH QUESTIONNAIRE - PHQ9
SUM OF ALL RESPONSES TO PHQ QUESTIONS 1-9: 0
SUM OF ALL RESPONSES TO PHQ9 QUESTIONS 1 & 2: 0
SUM OF ALL RESPONSES TO PHQ QUESTIONS 1-9: 0
1. LITTLE INTEREST OR PLEASURE IN DOING THINGS: 0
2. FEELING DOWN, DEPRESSED OR HOPELESS: 0
SUM OF ALL RESPONSES TO PHQ QUESTIONS 1-9: 0
SUM OF ALL RESPONSES TO PHQ QUESTIONS 1-9: 0

## 2023-03-07 NOTE — PROGRESS NOTES
3/7/2023    Blood pressure (!) 140/84, pulse 87, resp. rate 16, height 5' 4\" (1.626 m), weight 218 lb 12.8 oz (99.2 kg), last menstrual period 2012, SpO2 100 %, not currently breastfeeding. Juan Mckee (:  1965) is a 61 y.o. female, here for evaluation of the following medical concerns:    Chief Complaint   Patient presents with    Hypertension    Diabetes     Follow-up visit to review medications. Here for routine follow up of DM. With DPN. DM meds: metformin 1000 bid, Tresiba 36 units, amaryl 4, jardiance 25, trulicity 1.5  No SE's to meds. No UTI's. No n/v  Weight is stable. Her a1c is a bit higher this time  Her FBG levels are 150's. Lab Results   Component Value Date/Time    LABA1C 7.7 2023 12:06 PM    LABA1C 6.6 10/04/2022 03:01 PM    LABA1C 8.4 2022 10:23 AM      Unable to exercise much due to knee OA. She had RFA on her R knee . Pain decreased about 60%  wants it for left knee also. BP high today. Does test at home and at work - usually 130's/80's. Taking losartan, toprol, amiloride hct. Last renal function test: normal  Lab Results   Component Value Date/Time     2022 11:16 AM    K 4.1 2022 11:16 AM    BUN 17 2022 11:16 AM    CREATININE 0.6 2022 11:16 AM     CrCl cannot be calculated (Patient's most recent lab result is older than the maximum 180 days allowed. ). Last lipid test:  Lab Results   Component Value Date    CHOL 150 2022    TRIG 112 2022    HDL 63 (H) 2022    LDLCALC 65 2022     Lab Results   Component Value Date    ALT 17 2022    AST 15 2022         Patient Active Problem List   Diagnosis    Hypertension    Hyperlipidemia    Vitamin D deficiency    Solitary lung nodule- 5mm RML - rescan 2/15.     Type 2 diabetes mellitus with diabetic nephropathy (HCC)    Morbid obesity due to excess calories [facility])    Uterine leiomyoma    Primary osteoarthritis of both knees    Microalbuminuria        Body mass index is 37.56 kg/m². Wt Readings from Last 3 Encounters:   03/07/23 218 lb 12.8 oz (99.2 kg)   01/03/23 222 lb 0.1 oz (100.7 kg)   11/18/22 219 lb 6 oz (99.5 kg)       BP Readings from Last 3 Encounters:   03/07/23 (!) 140/84   01/03/23 (!) 158/89   11/18/22 (!) 143/89       Allergies   Allergen Reactions    Ppd        Prior to Visit Medications    Medication Sig Taking? Authorizing Provider   JARDIANCE 25 MG tablet TAKE 1 TABLET BY MOUTH DAILY Yes Sherri Mejia MD   aMILoride-HCTZ 5-50 MG TABS Take 1 tablet by mouth daily Yes Sherri Mejia MD   diclofenac sodium (VOLTAREN) 1 % GEL APPLY 4 GRAMS TOPICALLY 4 TIMES DAILY Yes Sherri Mejia MD   Dulaglutide 1.5 MG/0.5ML SOPN Inject 1.5 mg into the skin once a week Yes Sherri Mejia MD   glimepiride (AMARYL) 4 MG tablet TAKE 1 TABLET BY MOUTH ONCE DAILY IN THE MORNING BEFORE BREAKFAST Yes Sherri Mejia MD   Insulin Degludec (TRESIBA FLEXTOUCH) 200 UNIT/ML SOPN Take 32 units daily. Yes Sherri Mejia MD   losartan (COZAAR) 100 MG tablet Take 1 tablet by mouth once daily Yes Sherri Mejia MD   lovastatin (MEVACOR) 40 MG tablet Take 1 tablet by mouth once daily Yes Sherri Mejia MD   metFORMIN (GLUCOPHAGE) 1000 MG tablet TAKE 1 TABLET BY MOUTH TWICE DAILY WITH MEALS Yes Sherri Mejia MD   metoprolol succinate (TOPROL XL) 25 MG extended release tablet TAKE 1 TABLET BY MOUTH EVERY DAY Yes Sherri Mejia MD   Insulin Pen Needle (B-D UF III MINI PEN NEEDLES) 31G X 5 MM MISC USE 1  UNDER THE SKIN DAILY Yes Sherri Mejia MD   Multiple Vitamins-Minerals (CENTRUM PO) Take  by mouth daily.    Yes Historical Provider, MD        Social History     Tobacco Use    Smoking status: Never    Smokeless tobacco: Never   Vaping Use    Vaping Use: Never used   Substance Use Topics    Alcohol use: No    Drug use: No       Review of Systems No chest pains, dizziness, heart palpitations, dyspnea, lightheadedness, worsening edema. Physical Exam  Vitals and nursing note reviewed. Constitutional:       Appearance: Normal appearance. She is well-developed. Neck:      Thyroid: No thyromegaly. Cardiovascular:      Rate and Rhythm: Normal rate and regular rhythm. Pulses: Normal pulses. Heart sounds: Normal heart sounds. No murmur heard. Comments: Repeat /86 left arm sitting. Pulmonary:      Effort: Pulmonary effort is normal. No respiratory distress. Breath sounds: Normal breath sounds. No wheezing or rales. Musculoskeletal:         General: Normal range of motion. Cervical back: Normal range of motion. Skin:     General: Skin is warm and dry. Neurological:      General: No focal deficit present. Mental Status: She is alert and oriented to person, place, and time. Mental status is at baseline. Psychiatric:         Mood and Affect: Mood normal.         Behavior: Behavior normal.         Thought Content: Thought content normal.         Judgment: Judgment normal.       ASSESSMENT/PLAN:    1. Type 2 diabetes mellitus with diabetic nephropathy, with long-term current use of insulin ([facility])  - a1v higher- 7.7. increase tresiba to 28 units for high FBG's and she will attend to her diet with reducing sweets and carbs. - continue jardiance, metformin, glimip and trulicity 1.5 (we discussed increasing trulicity to 3, but she is about to receive a 3 month supply of the 1.5 mg dose in the mail and this medicine remains very expensive for her)  - POCT glycosylated hemoglobin (Hb A1C)    2. Primary hypertension  - bp hanging in there. Will continue current meds and monitor. She has used some IBU recently for knees and this is discouraged.     3. Primary osteoarthritis of both knees  - referred to pain mgmt for evaluation for geniculate nerve ablation  - AFL - Damon Gonzalez MD, Spine Surgery [facility] OF [facility]    4. Screening mammogram for breast cancer  - JOSE DIGITAL SCREEN W OR WO CAD BILATERAL; Future    5. Screen for colon cancer  - discussed CRC screening options (including colonoscopy being the 'gold standard'); pt elects to proceed with Cologuard fecal DNA after discussing the test and the process. - Fecal DNA Colorectal cancer screening (Cologuard)      Return in about 4 months (around 7/7/2023) for follow up diabetes, follow up HTN, fasting labs. An  [employer] was used to authenticate this note.     --Urvashi Grier MD on 3/7/2023 at 12:19 PM

## 2023-05-09 DIAGNOSIS — E11.21 TYPE 2 DIABETES MELLITUS WITH DIABETIC NEPHROPATHY, WITHOUT LONG-TERM CURRENT USE OF INSULIN (HCC): ICD-10-CM

## 2023-05-09 RX ORDER — EMPAGLIFLOZIN 25 MG/1
TABLET, FILM COATED ORAL
Qty: 90 TABLET | Refills: 1 | Status: SHIPPED | OUTPATIENT
Start: 2023-05-09

## 2023-05-16 ENCOUNTER — TELEPHONE (OUTPATIENT)
Dept: FAMILY MEDICINE CLINIC | Age: 58
End: 2023-05-16

## 2023-05-16 DIAGNOSIS — E11.21 TYPE 2 DIABETES MELLITUS WITH DIABETIC NEPHROPATHY, WITHOUT LONG-TERM CURRENT USE OF INSULIN (HCC): ICD-10-CM

## 2023-05-16 RX ORDER — DULAGLUTIDE 0.75 MG/.5ML
0.75 INJECTION, SOLUTION SUBCUTANEOUS WEEKLY
Status: CANCELLED | OUTPATIENT
Start: 2023-05-16

## 2023-05-16 RX ORDER — DULAGLUTIDE 3 MG/.5ML
3 INJECTION, SOLUTION SUBCUTANEOUS WEEKLY
Qty: 12 ADJUSTABLE DOSE PRE-FILLED PEN SYRINGE | Refills: 1 | Status: SHIPPED | OUTPATIENT
Start: 2023-05-16

## 2023-06-19 RX ORDER — DULAGLUTIDE 3 MG/.5ML
3 INJECTION, SOLUTION SUBCUTANEOUS WEEKLY
Qty: 12 ADJUSTABLE DOSE PRE-FILLED PEN SYRINGE | Refills: 1 | Status: SHIPPED | OUTPATIENT
Start: 2023-06-19

## 2023-07-05 ENCOUNTER — TELEPHONE (OUTPATIENT)
Dept: FAMILY MEDICINE CLINIC | Age: 58
End: 2023-07-05

## 2023-07-05 ENCOUNTER — HOSPITAL ENCOUNTER (EMERGENCY)
Age: 58
Discharge: HOME | End: 2023-07-05
Payer: COMMERCIAL

## 2023-07-05 VITALS
HEART RATE: 93 BPM | DIASTOLIC BLOOD PRESSURE: 82 MMHG | WEIGHT: 215.83 LBS | BODY MASS INDEX: 35.96 KG/M2 | SYSTOLIC BLOOD PRESSURE: 138 MMHG | HEIGHT: 65 IN | TEMPERATURE: 97.9 F | RESPIRATION RATE: 25 BRPM | OXYGEN SATURATION: 100 %

## 2023-07-05 DIAGNOSIS — R19.7 DIARRHEA, UNSPECIFIED TYPE: Primary | ICD-10-CM

## 2023-07-05 LAB
ALBUMIN SERPL-MCNC: 4.4 G/DL (ref 3.4–5)
ALBUMIN/GLOB SERPL: 1 {RATIO} (ref 1.1–2.2)
ALP SERPL-CCNC: 110 U/L (ref 40–129)
ALT SERPL-CCNC: 12 U/L (ref 10–40)
ANION GAP SERPL CALCULATED.3IONS-SCNC: 15 MMOL/L (ref 3–16)
AST SERPL-CCNC: 12 U/L (ref 15–37)
BACTERIA URNS QL MICRO: NORMAL /HPF
BASOPHILS # BLD: 0 K/UL (ref 0–0.2)
BASOPHILS NFR BLD: 0.4 %
BILIRUB SERPL-MCNC: 0.6 MG/DL (ref 0–1)
BILIRUB UR QL STRIP.AUTO: NEGATIVE
BUN SERPL-MCNC: 36 MG/DL (ref 7–20)
CALCIUM SERPL-MCNC: 10.3 MG/DL (ref 8.3–10.6)
CHLORIDE SERPL-SCNC: 99 MMOL/L (ref 99–110)
CLARITY UR: CLEAR
CO2 SERPL-SCNC: 22 MMOL/L (ref 21–32)
COLOR UR: YELLOW
CREAT SERPL-MCNC: 0.9 MG/DL (ref 0.6–1.1)
DEPRECATED RDW RBC AUTO: 14.8 % (ref 12.4–15.4)
EOSINOPHIL # BLD: 0.1 K/UL (ref 0–0.6)
EOSINOPHIL NFR BLD: 0.7 %
EPI CELLS #/AREA URNS AUTO: 1 /HPF (ref 0–5)
GFR SERPLBLD CREATININE-BSD FMLA CKD-EPI: >60 ML/MIN/{1.73_M2}
GLUCOSE SERPL-MCNC: 274 MG/DL (ref 70–99)
GLUCOSE UR STRIP.AUTO-MCNC: >=1000 MG/DL
HCG SERPL QL: NEGATIVE
HCT VFR BLD AUTO: 45.6 % (ref 36–48)
HGB BLD-MCNC: 15 G/DL (ref 12–16)
HGB UR QL STRIP.AUTO: NEGATIVE
HYALINE CASTS #/AREA URNS AUTO: 6 /LPF (ref 0–8)
KETONES UR STRIP.AUTO-MCNC: 15 MG/DL
LEUKOCYTE ESTERASE UR QL STRIP.AUTO: NEGATIVE
LIPASE SERPL-CCNC: 41 U/L (ref 13–60)
LYMPHOCYTES # BLD: 0.8 K/UL (ref 1–5.1)
LYMPHOCYTES NFR BLD: 10.9 %
MCH RBC QN AUTO: 29.5 PG (ref 26–34)
MCHC RBC AUTO-ENTMCNC: 33 G/DL (ref 31–36)
MCV RBC AUTO: 89.5 FL (ref 80–100)
MONOCYTES # BLD: 0.4 K/UL (ref 0–1.3)
MONOCYTES NFR BLD: 5.3 %
NEUTROPHILS # BLD: 6.2 K/UL (ref 1.7–7.7)
NEUTROPHILS NFR BLD: 82.7 %
NITRITE UR QL STRIP.AUTO: NEGATIVE
PH UR STRIP.AUTO: 6 [PH] (ref 5–8)
PLATELET # BLD AUTO: 193 K/UL (ref 135–450)
PLATELET BLD QL SMEAR: ADEQUATE
PMV BLD AUTO: 10.5 FL (ref 5–10.5)
POTASSIUM SERPL-SCNC: 3.3 MMOL/L (ref 3.5–5.1)
PROT SERPL-MCNC: 8.8 G/DL (ref 6.4–8.2)
PROT UR STRIP.AUTO-MCNC: ABNORMAL MG/DL
RBC # BLD AUTO: 5.09 M/UL (ref 4–5.2)
RBC CLUMPS #/AREA URNS AUTO: 1 /HPF (ref 0–4)
SLIDE REVIEW: ABNORMAL
SODIUM SERPL-SCNC: 136 MMOL/L (ref 136–145)
SP GR UR STRIP.AUTO: 1.04 (ref 1–1.03)
UA COMPLETE W REFLEX CULTURE PNL UR: ABNORMAL
UA DIPSTICK W REFLEX MICRO PNL UR: YES
URN SPEC COLLECT METH UR: ABNORMAL
UROBILINOGEN UR STRIP-ACNC: 0.2 E.U./DL
WBC # BLD AUTO: 7.5 K/UL (ref 4–11)
WBC #/AREA URNS AUTO: 1 /HPF (ref 0–5)

## 2023-07-05 PROCEDURE — 6370000000 HC RX 637 (ALT 250 FOR IP): Performed by: PHYSICIAN ASSISTANT

## 2023-07-05 PROCEDURE — 6360000002 HC RX W HCPCS: Performed by: PHYSICIAN ASSISTANT

## 2023-07-05 PROCEDURE — 85025 COMPLETE CBC W/AUTO DIFF WBC: CPT

## 2023-07-05 PROCEDURE — 99284 EMERGENCY DEPT VISIT MOD MDM: CPT

## 2023-07-05 PROCEDURE — 81001 URINALYSIS AUTO W/SCOPE: CPT

## 2023-07-05 PROCEDURE — 80053 COMPREHEN METABOLIC PANEL: CPT

## 2023-07-05 PROCEDURE — 84703 CHORIONIC GONADOTROPIN ASSAY: CPT

## 2023-07-05 PROCEDURE — 96374 THER/PROPH/DIAG INJ IV PUSH: CPT

## 2023-07-05 PROCEDURE — 83690 ASSAY OF LIPASE: CPT

## 2023-07-05 PROCEDURE — 2580000003 HC RX 258: Performed by: PHYSICIAN ASSISTANT

## 2023-07-05 PROCEDURE — 96361 HYDRATE IV INFUSION ADD-ON: CPT

## 2023-07-05 RX ORDER — 0.9 % SODIUM CHLORIDE 0.9 %
1000 INTRAVENOUS SOLUTION INTRAVENOUS ONCE
Status: COMPLETED | OUTPATIENT
Start: 2023-07-05 | End: 2023-07-05

## 2023-07-05 RX ORDER — POTASSIUM CHLORIDE 20 MEQ/1
20 TABLET, EXTENDED RELEASE ORAL ONCE
Status: COMPLETED | OUTPATIENT
Start: 2023-07-05 | End: 2023-07-05

## 2023-07-05 RX ORDER — DICYCLOMINE HYDROCHLORIDE 10 MG/1
10 CAPSULE ORAL ONCE
Status: COMPLETED | OUTPATIENT
Start: 2023-07-05 | End: 2023-07-05

## 2023-07-05 RX ORDER — ONDANSETRON 4 MG/1
4 TABLET, ORALLY DISINTEGRATING ORAL EVERY 8 HOURS PRN
Qty: 10 TABLET | Refills: 0 | Status: SHIPPED | OUTPATIENT
Start: 2023-07-05

## 2023-07-05 RX ORDER — ONDANSETRON 2 MG/ML
4 INJECTION INTRAMUSCULAR; INTRAVENOUS ONCE
Status: COMPLETED | OUTPATIENT
Start: 2023-07-05 | End: 2023-07-05

## 2023-07-05 RX ORDER — LOPERAMIDE HYDROCHLORIDE 2 MG/1
2 CAPSULE ORAL ONCE
Status: COMPLETED | OUTPATIENT
Start: 2023-07-05 | End: 2023-07-05

## 2023-07-05 RX ORDER — LOPERAMIDE HYDROCHLORIDE 2 MG/1
2 CAPSULE ORAL 4 TIMES DAILY PRN
Qty: 20 CAPSULE | Refills: 0 | Status: SHIPPED | OUTPATIENT
Start: 2023-07-05 | End: 2023-07-15

## 2023-07-05 RX ADMIN — DICYCLOMINE HYDROCHLORIDE 10 MG: 10 CAPSULE ORAL at 17:05

## 2023-07-05 RX ADMIN — LOPERAMIDE HYDROCHLORIDE 2 MG: 2 CAPSULE ORAL at 17:05

## 2023-07-05 RX ADMIN — SODIUM CHLORIDE 1000 ML: 9 INJECTION, SOLUTION INTRAVENOUS at 17:02

## 2023-07-05 RX ADMIN — POTASSIUM CHLORIDE 20 MEQ: 1500 TABLET, EXTENDED RELEASE ORAL at 18:29

## 2023-07-05 RX ADMIN — ONDANSETRON 4 MG: 2 INJECTION INTRAMUSCULAR; INTRAVENOUS at 17:05

## 2023-07-05 ASSESSMENT — PAIN DESCRIPTION - DESCRIPTORS: DESCRIPTORS: CRAMPING;DISCOMFORT

## 2023-07-05 ASSESSMENT — PAIN SCALES - GENERAL: PAINLEVEL_OUTOF10: 8

## 2023-07-05 ASSESSMENT — PAIN - FUNCTIONAL ASSESSMENT: PAIN_FUNCTIONAL_ASSESSMENT: 0-10

## 2023-07-05 ASSESSMENT — LIFESTYLE VARIABLES
HOW MANY STANDARD DRINKS CONTAINING ALCOHOL DO YOU HAVE ON A TYPICAL DAY: PATIENT DOES NOT DRINK
HOW OFTEN DO YOU HAVE A DRINK CONTAINING ALCOHOL: NEVER

## 2023-07-05 ASSESSMENT — PAIN DESCRIPTION - LOCATION: LOCATION: ABDOMEN

## 2023-07-05 NOTE — ED PROVIDER NOTES
[address] [facility] [address] [address]        Pt Name: Eduardo Vizcaino  MRN: [id number]  [address] [facility] 1965  Date of evaluation: 7/5/2023  Provider: DANYEL Pulido  PCP: Shantel Cordova MD  Note Started: 4:31 PM EDT 7/5/23      AYAD. I have evaluated this patient. CHIEF COMPLAINT       Chief Complaint   Patient presents with    Diarrhea     Since midnight been having diarrhea x12 times; does complain of N/V and abdominal pain; states been having body pain; denies fever/chills; states feels light headed as well; denies sick contact or eating/drinking anything different       HISTORY OF PRESENT ILLNESS: 1 or more Elements     History From: Patient    Eduardo Vizcaino is a 60 y.o. female who presents for diarrhea. Patient reports symptoms started at midnight when she was at work. Works at a nursing home. She has had 12 episodes of diarrhea since then. Denies red blood per rectum or black tarry stools. No nausea or vomiting. Reports she has abdominal pain only prior to needing to have a bowel movement and after the bowel movement, the pain resolves. Denies sick contacts. Denies fever Denies history of abdominal surgeries. She took Tylenol around 7 AM this morning. Nothing since. Nursing Notes were reviewed and agreed with or any disagreements were addressed in the HPI. REVIEW OF SYSTEMS :      Review of Systems    Positives and Pertinent negatives as per HPI.      SURGICAL HISTORY     Past Surgical History:   Procedure Laterality Date    NERVE SURGERY Right 11/18/2022    COOLIEF RADIOFREQUENCY ABLATION KNEE - RIGHT performed by Parish Peralta MD at [address] [place] [address]       Previous Medications    AMILORIDE-HCTZ 5-50 MG TABS    Take 1 tablet by mouth once daily    DICLOFENAC SODIUM (VOLTAREN) 1 % GEL    APPLY 4 GRAMS TOPICALLY 4 TIMES DAILY    DULAGLUTIDE (TRULICITY) 3 FS/4.9RD SOPN    Inject 3 mg

## 2023-07-05 NOTE — ED NOTES

## 2023-07-05 NOTE — TELEPHONE ENCOUNTER
Please advise rest and hydration. OK to hold these 3 meds until her symptoms resolve:  Amiloride-hctz  Glimepiride  Metformin    Thanks.

## 2023-07-05 NOTE — TELEPHONE ENCOUNTER
Patient called in stating that her stomach has been upset since around midnight and she has been having episodes of diarrhea and is very weak     I did schedule them for tomorrow, but she would like to know what she can do in the meantime?     Please Advise rectal bleed GI Bleed,

## 2023-07-05 NOTE — ED NOTES
Discharge and education instructions reviewed. Patient verbalized understanding, teach-back successful. Patient denied questions at this time. No acute distress noted. Patient instructed to follow-up as noted - return to emergency department if symptoms worsen. Patient verbalized understanding. Discharged per EDMD with discharge instructions.         Seamus Rodas RN  07/05/23 [id number]

## 2023-07-05 NOTE — ED TRIAGE NOTES
Patient arrived to the ED via wheelchair from home w/ complaints of diarrhea and abdominal pain.    Patient/EMS reports states midnight been having diarrhea x12 times; does complain of N/V and abdominal pain; states been having body pain; denies fever/chills; states feels light headed as well; denies sick contact or eating/drinking anything different    Patient A&O x 4, VSS,

## 2023-07-06 RX ORDER — INSULIN DEGLUDEC 200 U/ML
INJECTION, SOLUTION SUBCUTANEOUS
Qty: 9 ML | Refills: 3 | Status: SHIPPED | OUTPATIENT
Start: 2023-07-06 | End: 2023-08-21

## 2023-07-20 RX ORDER — GLIMEPIRIDE 4 MG/1
TABLET ORAL
Qty: 90 TABLET | Refills: 0 | Status: SHIPPED | OUTPATIENT
Start: 2023-07-20

## 2023-07-20 RX ORDER — LOSARTAN POTASSIUM 100 MG/1
TABLET ORAL
Qty: 90 TABLET | Refills: 0 | Status: SHIPPED | OUTPATIENT
Start: 2023-07-20

## 2023-08-21 ENCOUNTER — OFFICE VISIT (OUTPATIENT)
Dept: FAMILY MEDICINE CLINIC | Age: 58
End: 2023-08-21
Payer: COMMERCIAL

## 2023-08-21 DIAGNOSIS — I10 PRIMARY HYPERTENSION: Chronic | ICD-10-CM

## 2023-08-21 DIAGNOSIS — M17.0 PRIMARY OSTEOARTHRITIS OF BOTH KNEES: ICD-10-CM

## 2023-08-21 DIAGNOSIS — E11.21 TYPE 2 DIABETES MELLITUS WITH DIABETIC NEPHROPATHY, WITH LONG-TERM CURRENT USE OF INSULIN (HCC): Primary | ICD-10-CM

## 2023-08-21 DIAGNOSIS — Z79.4 TYPE 2 DIABETES MELLITUS WITH DIABETIC NEPHROPATHY, WITH LONG-TERM CURRENT USE OF INSULIN (HCC): ICD-10-CM

## 2023-08-21 DIAGNOSIS — Z79.4 TYPE 2 DIABETES MELLITUS WITH DIABETIC NEPHROPATHY, WITH LONG-TERM CURRENT USE OF INSULIN (HCC): Primary | ICD-10-CM

## 2023-08-21 DIAGNOSIS — E11.21 TYPE 2 DIABETES MELLITUS WITH DIABETIC NEPHROPATHY, WITH LONG-TERM CURRENT USE OF INSULIN (HCC): ICD-10-CM

## 2023-08-21 LAB
CREAT UR-MCNC: 82.8 MG/DL (ref 28–259)
HBA1C MFR BLD: 9 %
MICROALBUMIN UR DL<=1MG/L-MCNC: 4.1 MG/DL
MICROALBUMIN/CREAT UR: 49.5 MG/G (ref 0–30)

## 2023-08-21 PROCEDURE — 3052F HG A1C>EQUAL 8.0%<EQUAL 9.0%: CPT | Performed by: FAMILY MEDICINE

## 2023-08-21 PROCEDURE — 83036 HEMOGLOBIN GLYCOSYLATED A1C: CPT | Performed by: FAMILY MEDICINE

## 2023-08-21 PROCEDURE — 99214 OFFICE O/P EST MOD 30 MIN: CPT | Performed by: FAMILY MEDICINE

## 2023-08-21 PROCEDURE — 3074F SYST BP LT 130 MM HG: CPT | Performed by: FAMILY MEDICINE

## 2023-08-21 PROCEDURE — 3078F DIAST BP <80 MM HG: CPT | Performed by: FAMILY MEDICINE

## 2023-08-21 RX ORDER — INSULIN DEGLUDEC 200 U/ML
INJECTION, SOLUTION SUBCUTANEOUS
Qty: 9 ML | Refills: 3 | Status: SHIPPED | OUTPATIENT
Start: 2023-08-21

## 2023-08-21 NOTE — PATIENT INSTRUCTIONS
Increase Tresiba dose to 40 units  Goal for morning/fasting glucose is . I would like to see 90% of your fasting/AM readings under 130. Call if you EVER see a glucose reading under 70.

## 2023-08-23 VITALS
BODY MASS INDEX: 35.96 KG/M2 | RESPIRATION RATE: 18 BRPM | OXYGEN SATURATION: 98 % | TEMPERATURE: 98.3 F | WEIGHT: 215.8 LBS | HEART RATE: 87 BPM | HEIGHT: 65 IN | SYSTOLIC BLOOD PRESSURE: 132 MMHG | DIASTOLIC BLOOD PRESSURE: 84 MMHG

## 2023-08-23 RX ORDER — DULAGLUTIDE 4.5 MG/.5ML
4.5 INJECTION, SOLUTION SUBCUTANEOUS WEEKLY
Qty: 4 ADJUSTABLE DOSE PRE-FILLED PEN SYRINGE | Refills: 5 | Status: SHIPPED | OUTPATIENT
Start: 2023-08-23

## 2023-08-30 ENCOUNTER — TELEPHONE (OUTPATIENT)
Dept: PHARMACY | Age: 58
End: 2023-08-30

## 2023-08-30 NOTE — TELEPHONE ENCOUNTER
Spoke with [employer]. She canceled her appt through our automated system. Asked to reschedule and she reports she will call us back. Will follow along. Hanh Mar, PharmD  [facility]  Diabetes Service  [phone]    For Pharmacy Admin Tracking Only    Program: Medication Management  CPA in place:  Yes  Recommendation Provided To:    Intervention Detail:   Intervention Accepted By:   Felix Ribera Closed?:    Time Spent (min): 5

## 2023-09-14 ENCOUNTER — OFFICE VISIT (OUTPATIENT)
Dept: ORTHOPEDIC SURGERY | Age: 58
End: 2023-09-14

## 2023-09-14 VITALS — WEIGHT: 220.4 LBS | HEIGHT: 64 IN | BODY MASS INDEX: 37.63 KG/M2

## 2023-09-14 DIAGNOSIS — M25.561 PAIN IN BOTH KNEES, UNSPECIFIED CHRONICITY: Primary | ICD-10-CM

## 2023-09-14 DIAGNOSIS — M17.0 PRIMARY OSTEOARTHRITIS OF BOTH KNEES: ICD-10-CM

## 2023-09-14 DIAGNOSIS — M25.562 PAIN IN BOTH KNEES, UNSPECIFIED CHRONICITY: Primary | ICD-10-CM

## 2023-09-14 RX ORDER — BUPIVACAINE HYDROCHLORIDE 2.5 MG/ML
2 INJECTION, SOLUTION INFILTRATION; PERINEURAL ONCE
Status: COMPLETED | OUTPATIENT
Start: 2023-09-14 | End: 2023-09-14

## 2023-09-14 RX ORDER — TRIAMCINOLONE ACETONIDE 40 MG/ML
40 INJECTION, SUSPENSION INTRA-ARTICULAR; INTRAMUSCULAR ONCE
Status: COMPLETED | OUTPATIENT
Start: 2023-09-14 | End: 2023-09-14

## 2023-09-14 RX ADMIN — TRIAMCINOLONE ACETONIDE 40 MG: 40 INJECTION, SUSPENSION INTRA-ARTICULAR; INTRAMUSCULAR at 10:57

## 2023-09-14 RX ADMIN — BUPIVACAINE HYDROCHLORIDE 5 MG: 2.5 INJECTION, SOLUTION INFILTRATION; PERINEURAL at 10:56

## 2023-10-13 RX ORDER — METOPROLOL SUCCINATE 25 MG/1
TABLET, EXTENDED RELEASE ORAL
Qty: 90 TABLET | Refills: 0 | Status: SHIPPED | OUTPATIENT
Start: 2023-10-13

## 2023-10-13 RX ORDER — LOVASTATIN 40 MG/1
TABLET ORAL
Qty: 90 TABLET | Refills: 0 | Status: SHIPPED | OUTPATIENT
Start: 2023-10-13

## 2023-10-13 RX ORDER — AMILORIDE HYDROCHLORIDE AND HYDROCHLOROTHIAZIDE 5; 50 MG/1; MG/1
TABLET ORAL
Qty: 90 TABLET | Refills: 0 | Status: SHIPPED | OUTPATIENT
Start: 2023-10-13

## 2023-10-13 NOTE — TELEPHONE ENCOUNTER
Due for follow up with Dr Lady Pop- please call them to schedule at their earliest convenience. (meds have been refilled this time)

## 2023-10-26 RX ORDER — DULAGLUTIDE 4.5 MG/.5ML
4.5 INJECTION, SOLUTION SUBCUTANEOUS WEEKLY
Qty: 4 ADJUSTABLE DOSE PRE-FILLED PEN SYRINGE | Refills: 5 | OUTPATIENT
Start: 2023-10-26

## 2023-10-26 RX ORDER — AMILORIDE HYDROCHLORIDE AND HYDROCHLOROTHIAZIDE 5; 50 MG/1; MG/1
1 TABLET ORAL DAILY
Qty: 90 TABLET | Refills: 0 | OUTPATIENT
Start: 2023-10-26

## 2023-10-30 ENCOUNTER — TELEPHONE (OUTPATIENT)
Dept: FAMILY MEDICINE CLINIC | Age: 58
End: 2023-10-30

## 2023-10-30 ENCOUNTER — TELEPHONE (OUTPATIENT)
Dept: ADMINISTRATIVE | Age: 58
End: 2023-10-30

## 2023-10-30 RX ORDER — DULAGLUTIDE 4.5 MG/.5ML
4.5 INJECTION, SOLUTION SUBCUTANEOUS WEEKLY
Qty: 4 ADJUSTABLE DOSE PRE-FILLED PEN SYRINGE | Refills: 5 | Status: SHIPPED | OUTPATIENT
Start: 2023-10-30

## 2023-11-03 NOTE — TELEPHONE ENCOUNTER
I couldn't refax for status check. So I called and spoke to Christiano and she said it is in review.

## 2023-11-07 RX ORDER — DULAGLUTIDE 4.5 MG/.5ML
4.5 INJECTION, SOLUTION SUBCUTANEOUS WEEKLY
Qty: 4 ADJUSTABLE DOSE PRE-FILLED PEN SYRINGE | Refills: 5 | OUTPATIENT
Start: 2023-11-07

## 2023-11-09 NOTE — TELEPHONE ENCOUNTER
I talked to Antony at [facility] and she said that she is faxing questions to be answered. CMM not showing questions.

## 2023-11-09 NOTE — TELEPHONE ENCOUNTER
RECEIVED QUESTIONS - FILLED OUT AND FAXED to [address] [facility].

## 2023-11-10 NOTE — TELEPHONE ENCOUNTER
Patient called and notified that her Truliciity  was approved from 11/10/2023-11/08/2024, form scanned in.

## 2023-11-15 RX ORDER — LOSARTAN POTASSIUM 100 MG/1
TABLET ORAL
Qty: 90 TABLET | Refills: 1 | Status: SHIPPED | OUTPATIENT
Start: 2023-11-15 | End: 2024-01-05 | Stop reason: SDUPTHER

## 2023-11-15 RX ORDER — GLIMEPIRIDE 4 MG/1
TABLET ORAL
Qty: 90 TABLET | Refills: 1 | Status: SHIPPED | OUTPATIENT
Start: 2023-11-15 | End: 2024-01-05 | Stop reason: SDUPTHER

## 2023-11-22 ENCOUNTER — TELEPHONE (OUTPATIENT)
Dept: PHARMACY | Age: 58
End: 2023-11-22

## 2023-11-22 NOTE — TELEPHONE ENCOUNTER
New referral for Diabetes Services. Reached out to schedule the initial appointment. Spoke with patient. Scheduled for 11/27/2023    Soha Cedillo PharmD  [facility]  Diabetes Service  [phone]    For Pharmacy Admin Tracking Only    Program: Medication Management  CPA in place:    Recommendation Provided To:    Intervention Detail:   Intervention Accepted By:   Vannessa Marin Closed?:    Time Spent (min): 10

## 2023-11-27 ENCOUNTER — TELEPHONE (OUTPATIENT)
Dept: PHARMACY | Age: 58
End: 2023-11-27

## 2023-11-27 NOTE — TELEPHONE ENCOUNTER
Reached out to [place]. She reports she is sick with diarrhea and cannot come in. She would like to call us to reschedule. I gave her our number. April Rodriguez, PharmD  [facility]  Diabetes Service  [phone]    For Pharmacy Admin Tracking Only    Program: Medication Management  CPA in place:  Yes  Recommendation Provided To:    Intervention Detail:   Intervention Accepted By:   Amanda Kidney Closed?:    Time Spent (min): 10

## 2023-12-15 ENCOUNTER — TELEPHONE (OUTPATIENT)
Dept: PHARMACY | Age: 58
End: 2023-12-15

## 2023-12-15 NOTE — TELEPHONE ENCOUNTER
Called to reschedule an appointment for diabetes management. Left message to return my call. Steffanie Muñiz, JoeD  [facility]  Diabetes Service  [phone]    For Pharmacy Admin Tracking Only    Program: Medication Management  CPA in place:  Yes  Recommendation Provided To:    Intervention Detail:   Intervention Accepted By:   Raoul Arreola Closed?:    Time Spent (min): 5

## 2024-01-05 ENCOUNTER — OFFICE VISIT (OUTPATIENT)
Dept: FAMILY MEDICINE CLINIC | Age: 59
End: 2024-01-05
Payer: COMMERCIAL

## 2024-01-05 VITALS
OXYGEN SATURATION: 98 % | RESPIRATION RATE: 18 BRPM | WEIGHT: 215 LBS | DIASTOLIC BLOOD PRESSURE: 84 MMHG | BODY MASS INDEX: 36.7 KG/M2 | SYSTOLIC BLOOD PRESSURE: 132 MMHG | HEIGHT: 64 IN | HEART RATE: 88 BPM | TEMPERATURE: 98.5 F

## 2024-01-05 DIAGNOSIS — I10 PRIMARY HYPERTENSION: Chronic | ICD-10-CM

## 2024-01-05 DIAGNOSIS — E78.00 PURE HYPERCHOLESTEROLEMIA: ICD-10-CM

## 2024-01-05 DIAGNOSIS — Z79.4 TYPE 2 DIABETES MELLITUS WITH DIABETIC NEPHROPATHY, WITH LONG-TERM CURRENT USE OF INSULIN (HCC): Primary | ICD-10-CM

## 2024-01-05 DIAGNOSIS — E11.21 TYPE 2 DIABETES MELLITUS WITH DIABETIC NEPHROPATHY, WITH LONG-TERM CURRENT USE OF INSULIN (HCC): Primary | ICD-10-CM

## 2024-01-05 DIAGNOSIS — Z12.11 SCREEN FOR COLON CANCER: ICD-10-CM

## 2024-01-05 DIAGNOSIS — Z12.31 ENCOUNTER FOR SCREENING MAMMOGRAM FOR MALIGNANT NEOPLASM OF BREAST: ICD-10-CM

## 2024-01-05 LAB
ALBUMIN SERPL-MCNC: 4.7 G/DL (ref 3.4–5)
ALBUMIN/GLOB SERPL: 1.6 {RATIO} (ref 1.1–2.2)
ALP SERPL-CCNC: 104 U/L (ref 40–129)
ALT SERPL-CCNC: 12 U/L (ref 10–40)
ANION GAP SERPL CALCULATED.3IONS-SCNC: 12 MMOL/L (ref 3–16)
AST SERPL-CCNC: 15 U/L (ref 15–37)
BILIRUB SERPL-MCNC: 0.6 MG/DL (ref 0–1)
BUN SERPL-MCNC: 18 MG/DL (ref 7–20)
CALCIUM SERPL-MCNC: 10 MG/DL (ref 8.3–10.6)
CHLORIDE SERPL-SCNC: 99 MMOL/L (ref 99–110)
CHOLEST SERPL-MCNC: 136 MG/DL (ref 0–199)
CO2 SERPL-SCNC: 29 MMOL/L (ref 21–32)
CREAT SERPL-MCNC: 0.7 MG/DL (ref 0.6–1.1)
GFR SERPLBLD CREATININE-BSD FMLA CKD-EPI: >60 ML/MIN/{1.73_M2}
GLUCOSE SERPL-MCNC: 132 MG/DL (ref 70–99)
HBA1C MFR BLD: 8.5 %
HDLC SERPL-MCNC: 64 MG/DL (ref 40–60)
LDLC SERPL CALC-MCNC: 57 MG/DL
POTASSIUM SERPL-SCNC: 4.2 MMOL/L (ref 3.5–5.1)
PROT SERPL-MCNC: 7.7 G/DL (ref 6.4–8.2)
SODIUM SERPL-SCNC: 140 MMOL/L (ref 136–145)
TRIGL SERPL-MCNC: 73 MG/DL (ref 0–150)
VLDLC SERPL CALC-MCNC: 15 MG/DL

## 2024-01-05 PROCEDURE — 90715 TDAP VACCINE 7 YRS/> IM: CPT | Performed by: FAMILY MEDICINE

## 2024-01-05 PROCEDURE — 90471 IMMUNIZATION ADMIN: CPT | Performed by: FAMILY MEDICINE

## 2024-01-05 PROCEDURE — 83036 HEMOGLOBIN GLYCOSYLATED A1C: CPT | Performed by: FAMILY MEDICINE

## 2024-01-05 PROCEDURE — 99214 OFFICE O/P EST MOD 30 MIN: CPT | Performed by: FAMILY MEDICINE

## 2024-01-05 PROCEDURE — 3075F SYST BP GE 130 - 139MM HG: CPT | Performed by: FAMILY MEDICINE

## 2024-01-05 PROCEDURE — 3052F HG A1C>EQUAL 8.0%<EQUAL 9.0%: CPT | Performed by: FAMILY MEDICINE

## 2024-01-05 PROCEDURE — 3079F DIAST BP 80-89 MM HG: CPT | Performed by: FAMILY MEDICINE

## 2024-01-05 RX ORDER — AMILORIDE HYDROCHLORIDE AND HYDROCHLOROTHIAZIDE 5; 50 MG/1; MG/1
1 TABLET ORAL DAILY
Qty: 90 TABLET | Refills: 1 | Status: SHIPPED | OUTPATIENT
Start: 2024-01-05

## 2024-01-05 RX ORDER — INSULIN DEGLUDEC 200 U/ML
INJECTION, SOLUTION SUBCUTANEOUS
Qty: 9 ML | Refills: 3 | Status: SHIPPED | OUTPATIENT
Start: 2024-01-05 | End: 2024-01-05 | Stop reason: SDUPTHER

## 2024-01-05 RX ORDER — DULAGLUTIDE 4.5 MG/.5ML
4.5 INJECTION, SOLUTION SUBCUTANEOUS WEEKLY
Qty: 4 ADJUSTABLE DOSE PRE-FILLED PEN SYRINGE | Refills: 5 | Status: SHIPPED | OUTPATIENT
Start: 2024-01-05

## 2024-01-05 RX ORDER — FLURBIPROFEN SODIUM 0.3 MG/ML
SOLUTION/ DROPS OPHTHALMIC
Qty: 100 EACH | Refills: 2 | Status: SHIPPED | OUTPATIENT
Start: 2024-01-05

## 2024-01-05 RX ORDER — METOPROLOL SUCCINATE 25 MG/1
25 TABLET, EXTENDED RELEASE ORAL DAILY
Qty: 90 TABLET | Refills: 0 | Status: SHIPPED | OUTPATIENT
Start: 2024-01-05

## 2024-01-05 RX ORDER — LOVASTATIN 40 MG/1
40 TABLET ORAL DAILY
Qty: 90 TABLET | Refills: 0 | Status: SHIPPED | OUTPATIENT
Start: 2024-01-05

## 2024-01-05 RX ORDER — GLIMEPIRIDE 4 MG/1
TABLET ORAL
Qty: 90 TABLET | Refills: 1 | Status: SHIPPED | OUTPATIENT
Start: 2024-01-05

## 2024-01-05 RX ORDER — LOSARTAN POTASSIUM 100 MG/1
100 TABLET ORAL DAILY
Qty: 90 TABLET | Refills: 1 | Status: SHIPPED | OUTPATIENT
Start: 2024-01-05

## 2024-01-05 ASSESSMENT — PATIENT HEALTH QUESTIONNAIRE - PHQ9
2. FEELING DOWN, DEPRESSED OR HOPELESS: 0
SUM OF ALL RESPONSES TO PHQ9 QUESTIONS 1 & 2: 0
SUM OF ALL RESPONSES TO PHQ QUESTIONS 1-9: 0
1. LITTLE INTEREST OR PLEASURE IN DOING THINGS: 0
SUM OF ALL RESPONSES TO PHQ QUESTIONS 1-9: 0

## 2024-01-05 NOTE — PROGRESS NOTES
2024    Blood pressure 132/84, pulse 88, temperature 98.5 °F (36.9 °C), temperature source Oral, resp. rate 18, height 1.626 m (5' 4.02\"), weight 97.5 kg (215 lb), last menstrual period 2012, SpO2 98 %, not currently breastfeeding.    Chelsy Zhong (:  1965) is a 58 y.o. female, here for evaluation of the following medical concerns:    Chief Complaint   Patient presents with    Diabetes     Sugar has been good when she checks.    Hypertension     BP has been good.     Here for fu DM, HTN.  She has a new insurance that has better pharm formulary.    Dm-2: with nephropathy.  Meds: metformin 1000 mg/d (is to be bid, did not have side effects), Tresiba 40, amaryl 4, jardiance 25 (she has been taking both amaryl and jardiance at night).  Trulicity 4.5mg.    Her a1c today is 8.5%.  Am sugars are ~ 150-160.  Does not test after meals.     Lab Results   Component Value Date/Time    LABA1C 9.0 2023 11:46 AM    LABA1C 7.7 2023 12:06 PM    LABA1C 6.6 10/04/2022 03:01 PM      She is UTD on eye exam.    Last renal function test:   Lab Results   Component Value Date/Time     2023 04:43 PM    K 3.3 2023 04:43 PM    CL 99 2023 04:43 PM    CO2 22 2023 04:43 PM    BUN 36 2023 04:43 PM    CREATININE 0.9 2023 04:43 PM    GLUCOSE 274 2023 04:43 PM    CALCIUM 10.3 2023 04:43 PM    LABGLOM >60 2023 04:43 PM        BP stable today on current regimen: losartan, amiloride, toprol.    She takes lova. No hx MI/CVD. She denies SE's.  Is fasting today.  Last lipid test:  Lab Results   Component Value Date    CHOL 150 2022    TRIG 112 2022    HDL 63 (H) 2022    LDLCALC 65 2022     Lab Results   Component Value Date    ALT 12 2023    AST 12 (L) 2023     No longer has cologuard kit at home.      Patient Active Problem List   Diagnosis    Hypertension    Hyperlipidemia    Vitamin D deficiency    Solitary lung

## 2024-01-06 RX ORDER — INSULIN DEGLUDEC 200 U/ML
INJECTION, SOLUTION SUBCUTANEOUS
Qty: 3 ML | Refills: 0 | COMMUNITY
Start: 2024-01-06

## 2024-01-15 RX ORDER — METOPROLOL SUCCINATE 25 MG/1
25 TABLET, EXTENDED RELEASE ORAL DAILY
Qty: 90 TABLET | Refills: 0 | OUTPATIENT
Start: 2024-01-15

## 2024-01-15 RX ORDER — LOVASTATIN 40 MG/1
40 TABLET ORAL DAILY
Qty: 90 TABLET | Refills: 0 | OUTPATIENT
Start: 2024-01-15

## 2024-02-29 LAB — NONINV COLON CA DNA+OCC BLD SCRN STL QL: NEGATIVE

## 2024-03-22 ENCOUNTER — TELEPHONE (OUTPATIENT)
Dept: FAMILY MEDICINE CLINIC | Age: 59
End: 2024-03-22

## 2024-03-22 NOTE — TELEPHONE ENCOUNTER
Pt came in, stated she has not been able to get trulicity for a month, her pharmacy has not been able to get it. Stated [facility] can fill 075mg/0.5ml or 3mg/0.5ml.    Please advise  Pt can be reached at [phone]

## 2024-03-22 NOTE — TELEPHONE ENCOUNTER
OK to reduce dose to 3 mg weekly to keep the medicine going.  I will order that dose for oladoyin.    She is due for diabetes follow up in April:       Lab Results   Component Value Date/Time    LABA1C 8.5 01/05/2024 12:14 PM    LABA1C 9.0 08/21/2023 11:46 AM    LABA1C 7.7 03/07/2023 12:06 PM      Thanks.

## 2024-04-22 RX ORDER — METOPROLOL SUCCINATE 25 MG/1
25 TABLET, EXTENDED RELEASE ORAL DAILY
Qty: 90 TABLET | Refills: 1 | Status: SHIPPED | OUTPATIENT
Start: 2024-04-22

## 2024-04-22 RX ORDER — LOVASTATIN 40 MG/1
40 TABLET ORAL DAILY
Qty: 90 TABLET | Refills: 1 | Status: SHIPPED | OUTPATIENT
Start: 2024-04-22

## 2024-05-03 ENCOUNTER — OFFICE VISIT (OUTPATIENT)
Dept: FAMILY MEDICINE CLINIC | Age: 59
End: 2024-05-03
Payer: COMMERCIAL

## 2024-05-03 VITALS
DIASTOLIC BLOOD PRESSURE: 86 MMHG | HEIGHT: 64 IN | SYSTOLIC BLOOD PRESSURE: 136 MMHG | WEIGHT: 215 LBS | RESPIRATION RATE: 14 BRPM | BODY MASS INDEX: 36.7 KG/M2 | HEART RATE: 84 BPM

## 2024-05-03 DIAGNOSIS — E11.21 TYPE 2 DIABETES MELLITUS WITH DIABETIC NEPHROPATHY, WITH LONG-TERM CURRENT USE OF INSULIN (HCC): Primary | ICD-10-CM

## 2024-05-03 DIAGNOSIS — E11.21 TYPE 2 DIABETES MELLITUS WITH DIABETIC NEPHROPATHY, WITH LONG-TERM CURRENT USE OF INSULIN (HCC): ICD-10-CM

## 2024-05-03 DIAGNOSIS — E66.01 MORBID OBESITY DUE TO EXCESS CALORIES (HCC): ICD-10-CM

## 2024-05-03 DIAGNOSIS — Z79.4 TYPE 2 DIABETES MELLITUS WITH DIABETIC NEPHROPATHY, WITH LONG-TERM CURRENT USE OF INSULIN (HCC): Primary | ICD-10-CM

## 2024-05-03 DIAGNOSIS — I10 PRIMARY HYPERTENSION: Chronic | ICD-10-CM

## 2024-05-03 DIAGNOSIS — Z79.4 TYPE 2 DIABETES MELLITUS WITH DIABETIC NEPHROPATHY, WITH LONG-TERM CURRENT USE OF INSULIN (HCC): ICD-10-CM

## 2024-05-03 PROBLEM — R80.9 MICROALBUMINURIA: Status: RESOLVED | Noted: 2018-10-09 | Resolved: 2024-05-03

## 2024-05-03 LAB — HBA1C MFR BLD: 7.1 %

## 2024-05-03 PROCEDURE — 3051F HG A1C>EQUAL 7.0%<8.0%: CPT | Performed by: FAMILY MEDICINE

## 2024-05-03 PROCEDURE — 3075F SYST BP GE 130 - 139MM HG: CPT | Performed by: FAMILY MEDICINE

## 2024-05-03 PROCEDURE — 83036 HEMOGLOBIN GLYCOSYLATED A1C: CPT | Performed by: FAMILY MEDICINE

## 2024-05-03 PROCEDURE — 99214 OFFICE O/P EST MOD 30 MIN: CPT | Performed by: FAMILY MEDICINE

## 2024-05-03 PROCEDURE — 3079F DIAST BP 80-89 MM HG: CPT | Performed by: FAMILY MEDICINE

## 2024-05-03 SDOH — ECONOMIC STABILITY: FOOD INSECURITY: WITHIN THE PAST 12 MONTHS, THE FOOD YOU BOUGHT JUST DIDN'T LAST AND YOU DIDN'T HAVE MONEY TO GET MORE.: NEVER TRUE

## 2024-05-03 SDOH — ECONOMIC STABILITY: FOOD INSECURITY: WITHIN THE PAST 12 MONTHS, YOU WORRIED THAT YOUR FOOD WOULD RUN OUT BEFORE YOU GOT MONEY TO BUY MORE.: NEVER TRUE

## 2024-05-03 SDOH — ECONOMIC STABILITY: INCOME INSECURITY: HOW HARD IS IT FOR YOU TO PAY FOR THE VERY BASICS LIKE FOOD, HOUSING, MEDICAL CARE, AND HEATING?: NOT HARD AT ALL

## 2024-05-03 NOTE — PROGRESS NOTES
5/3/2024    Blood pressure 136/86, pulse 84, resp. rate 14, height 1.626 m (5' 4\"), weight 97.5 kg (215 lb), last menstrual period 2012, not currently breastfeeding.    Chelsy Zhong (:  1965) is a 58 y.o. female, here for evaluation of the following medical concerns:    Chief Complaint   Patient presents with    Diabetes     Here for f/u DM. With microalbuminuria.       DM meds: metformin 1000 bid, Tresiba 40 units, amaryl 4, jardiance 25, trulicity 4.5 (SAME AS LAST VISIT)    A1c today is 7.1, a great improvement!    Meds are same, but sugars are better. She feels she is eating less. Weight unchanged.  Fingerstick:  AM sugars: varies   After meals: 140 is common.     Lab Results   Component Value Date/Time    LABA1C 8.5 2024 12:14 PM    LABA1C 9.0 2023 11:46 AM    LABA1C 7.7 2023 12:06 PM      Has not had eye exam.  Usually goes to Dr Sherman. No hx retionopathy    HTN: takes amiloride hct, losartan, toprol.  No SE's to these.  Leg swelling is better.  No hx of CAD, TIA, CVA or PVD.   Last renal function test:   Lab Results   Component Value Date/Time     2024 12:38 PM    K 4.2 2024 12:38 PM    CL 99 2024 12:38 PM    CO2 29 2024 12:38 PM    BUN 18 2024 12:38 PM    CREATININE 0.7 2024 12:38 PM    GLUCOSE 132 2024 12:38 PM    CALCIUM 10.0 2024 12:38 PM    LABGLOM >60 2024 12:38 PM        On lova 40 due to diabetes.    Last lipid test:  Lab Results   Component Value Date    CHOL 136 2024    TRIG 73 2024    HDL 64 (H) 2024     Lab Results   Component Value Date    ALT 12 2024    AST 15 2024       No new problems  No chest pains, dizziness, heart palpitations, dyspnea, lightheadedness, worsening edema.          Patient Active Problem List   Diagnosis    Hypertension    Hyperlipidemia    Vitamin D deficiency    Solitary lung nodule- 5mm RML - rescan 2/15.    Type 2 diabetes

## 2024-05-04 LAB
CREAT UR-MCNC: 69.2 MG/DL (ref 28–259)
MICROALBUMIN UR DL<=1MG/L-MCNC: 2.5 MG/DL
MICROALBUMIN/CREAT UR: 36.1 MG/G (ref 0–30)

## 2024-05-06 RX ORDER — DULAGLUTIDE 3 MG/.5ML
INJECTION, SOLUTION SUBCUTANEOUS
Qty: 4 ML | Refills: 2 | Status: SHIPPED | OUTPATIENT
Start: 2024-05-06

## 2024-05-16 ENCOUNTER — HOSPITAL ENCOUNTER (OUTPATIENT)
Age: 59
Discharge: HOME | End: 2024-05-16
Payer: COMMERCIAL

## 2024-05-16 ENCOUNTER — HOSPITAL ENCOUNTER (OUTPATIENT)
Dept: GENERAL RADIOLOGY | Age: 59
Discharge: HOME | End: 2024-05-16
Payer: COMMERCIAL

## 2024-05-16 PROCEDURE — 71046 X-RAY EXAM CHEST 2 VIEWS: CPT

## 2024-07-18 ENCOUNTER — TELEPHONE (OUTPATIENT)
Dept: PHARMACY | Age: 59
End: 2024-07-18

## 2024-07-18 NOTE — TELEPHONE ENCOUNTER
Called to reschedule an appointment for diabetes management.     Left message to return my call.     Anna Lincoln PharmMARILIN  [facility]  Diabetes Service  [phone]    Anna Lincoln PharmD  [facility]  Anticoagulation  [phone]    For Pharmacy Admin Tracking Only    Program: Medication Management  CPA in place:  Yes  Recommendation Provided To:   Intervention Detail:   Intervention Accepted By:   Gap Closed?:    Time Spent (min): 5

## 2024-07-22 RX ORDER — AMILORIDE HYDROCHLORIDE AND HYDROCHLOROTHIAZIDE 5; 50 MG/1; MG/1
1 TABLET ORAL DAILY
Qty: 90 TABLET | Refills: 0 | Status: SHIPPED | OUTPATIENT
Start: 2024-07-22

## 2024-07-22 RX ORDER — EMPAGLIFLOZIN 25 MG/1
25 TABLET, FILM COATED ORAL DAILY
Qty: 90 TABLET | Refills: 1 | Status: SHIPPED | OUTPATIENT
Start: 2024-07-22

## 2024-08-26 RX ORDER — DULAGLUTIDE 4.5 MG/.5ML
INJECTION, SOLUTION SUBCUTANEOUS
Qty: 4 ML | Refills: 1 | Status: SHIPPED | OUTPATIENT
Start: 2024-08-26

## 2024-10-11 ENCOUNTER — TELEPHONE (OUTPATIENT)
Dept: ADMINISTRATIVE | Age: 59
End: 2024-10-11

## 2024-10-11 NOTE — TELEPHONE ENCOUNTER
Received a PA request for [facility] PA (Key: [id number]) . There is no insurance listed in the chart for the patient. If PA is needed please submit back to PA pool once this has been obtained.     Thank you.

## 2024-10-14 NOTE — TELEPHONE ENCOUNTER
Submitted PA for Trulicity 4.5MG/0.5ML auto-injectors   Via CMM Key: [id number] STATUS:  not sent    Per [employer] ([facility]):  \"Eligibility could not be verified for this patient - patient not found. Please review patient information.\" Please advise.    If this requires a response please respond to the pool. (P [facility] MEDICINE Pre-Auth).    Please advise patient thank you.

## 2024-10-14 NOTE — TELEPHONE ENCOUNTER
Please let pt know we are not able to complete PA as we cannot verify Ins benefits for drug coverage.  She should contact Ins   Thank you

## 2024-10-21 RX ORDER — METOPROLOL SUCCINATE 25 MG/1
25 TABLET, EXTENDED RELEASE ORAL DAILY
Qty: 90 TABLET | Refills: 1 | Status: SHIPPED | OUTPATIENT
Start: 2024-10-21

## 2024-10-21 RX ORDER — GLIMEPIRIDE 4 MG/1
TABLET ORAL
Qty: 90 TABLET | Refills: 1 | Status: SHIPPED | OUTPATIENT
Start: 2024-10-21

## 2024-10-21 RX ORDER — AMILORIDE HYDROCHLORIDE AND HYDROCHLOROTHIAZIDE 5; 50 MG/1; MG/1
1 TABLET ORAL DAILY
Qty: 90 TABLET | Refills: 1 | Status: SHIPPED | OUTPATIENT
Start: 2024-10-21

## 2024-10-21 RX ORDER — LOVASTATIN 40 MG/1
40 TABLET ORAL DAILY
Qty: 90 TABLET | Refills: 1 | Status: SHIPPED | OUTPATIENT
Start: 2024-10-21

## 2024-11-01 ENCOUNTER — TELEPHONE (OUTPATIENT)
Dept: FAMILY MEDICINE CLINIC | Age: 59
End: 2024-11-01

## 2024-11-01 ENCOUNTER — OFFICE VISIT (OUTPATIENT)
Dept: FAMILY MEDICINE CLINIC | Age: 59
End: 2024-11-01
Payer: COMMERCIAL

## 2024-11-01 ENCOUNTER — HOSPITAL ENCOUNTER (OUTPATIENT)
Dept: GENERAL RADIOLOGY | Age: 59
Discharge: HOME | End: 2024-11-01
Attending: FAMILY MEDICINE
Payer: COMMERCIAL

## 2024-11-01 ENCOUNTER — HOSPITAL ENCOUNTER (OUTPATIENT)
Age: 59
Discharge: HOME | End: 2024-11-01
Payer: COMMERCIAL

## 2024-11-01 VITALS
RESPIRATION RATE: 18 BRPM | SYSTOLIC BLOOD PRESSURE: 140 MMHG | WEIGHT: 214.4 LBS | HEART RATE: 87 BPM | OXYGEN SATURATION: 98 % | DIASTOLIC BLOOD PRESSURE: 90 MMHG | BODY MASS INDEX: 36.8 KG/M2

## 2024-11-01 DIAGNOSIS — Z79.4 TYPE 2 DIABETES MELLITUS WITH DIABETIC NEPHROPATHY, WITH LONG-TERM CURRENT USE OF INSULIN (HCC): ICD-10-CM

## 2024-11-01 DIAGNOSIS — E11.21 TYPE 2 DIABETES MELLITUS WITH DIABETIC NEPHROPATHY, WITH LONG-TERM CURRENT USE OF INSULIN (HCC): ICD-10-CM

## 2024-11-01 DIAGNOSIS — M19.019 INFLAMMATION OF SHOULDER JOINT: ICD-10-CM

## 2024-11-01 DIAGNOSIS — M19.019 INFLAMMATION OF SHOULDER JOINT: Primary | ICD-10-CM

## 2024-11-01 PROBLEM — M25.512 ACUTE PAIN OF LEFT SHOULDER: Status: ACTIVE | Noted: 2024-11-01

## 2024-11-01 LAB
BASOPHILS # BLD: 0 K/UL (ref 0–0.2)
BASOPHILS NFR BLD: 0.3 %
DEPRECATED RDW RBC AUTO: 14.7 % (ref 12.4–15.4)
EOSINOPHIL # BLD: 0.1 K/UL (ref 0–0.6)
EOSINOPHIL NFR BLD: 2.7 %
HBA1C MFR BLD: 7.8 %
HCT VFR BLD AUTO: 40.4 % (ref 36–48)
HGB BLD-MCNC: 13.1 G/DL (ref 12–16)
LYMPHOCYTES # BLD: 2.1 K/UL (ref 1–5.1)
LYMPHOCYTES NFR BLD: 43.3 %
MCH RBC QN AUTO: 29.1 PG (ref 26–34)
MCHC RBC AUTO-ENTMCNC: 32.3 G/DL (ref 31–36)
MCV RBC AUTO: 90.1 FL (ref 80–100)
MONOCYTES # BLD: 0.3 K/UL (ref 0–1.3)
MONOCYTES NFR BLD: 5.9 %
NEUTROPHILS # BLD: 2.4 K/UL (ref 1.7–7.7)
NEUTROPHILS NFR BLD: 47.8 %
PLATELET # BLD AUTO: 205 K/UL (ref 135–450)
PMV BLD AUTO: 10.8 FL (ref 5–10.5)
RBC # BLD AUTO: 4.48 M/UL (ref 4–5.2)
WBC # BLD AUTO: 4.9 K/UL (ref 4–11)

## 2024-11-01 PROCEDURE — 3080F DIAST BP >= 90 MM HG: CPT | Performed by: FAMILY MEDICINE

## 2024-11-01 PROCEDURE — 99214 OFFICE O/P EST MOD 30 MIN: CPT | Performed by: FAMILY MEDICINE

## 2024-11-01 PROCEDURE — 3051F HG A1C>EQUAL 7.0%<8.0%: CPT | Performed by: FAMILY MEDICINE

## 2024-11-01 PROCEDURE — 3077F SYST BP >= 140 MM HG: CPT | Performed by: FAMILY MEDICINE

## 2024-11-01 PROCEDURE — 83036 HEMOGLOBIN GLYCOSYLATED A1C: CPT | Performed by: FAMILY MEDICINE

## 2024-11-01 PROCEDURE — 73030 X-RAY EXAM OF SHOULDER: CPT

## 2024-11-01 RX ORDER — ACETAMINOPHEN AND CODEINE PHOSPHATE 300; 30 MG/1; MG/1
1 TABLET ORAL EVERY 6 HOURS PRN
Qty: 16 TABLET | Refills: 0 | Status: SHIPPED | OUTPATIENT
Start: 2024-11-01 | End: 2024-11-05

## 2024-11-01 RX ORDER — METHYLPREDNISOLONE 4 MG/1
TABLET ORAL
Qty: 1 KIT | Refills: 0 | Status: SHIPPED | OUTPATIENT
Start: 2024-11-01 | End: 2024-11-07

## 2024-11-01 ASSESSMENT — ENCOUNTER SYMPTOMS
GASTROINTESTINAL NEGATIVE: 1
RESPIRATORY NEGATIVE: 1
EYES NEGATIVE: 1
ALLERGIC/IMMUNOLOGIC NEGATIVE: 1

## 2024-11-01 NOTE — TELEPHONE ENCOUNTER
[facility] calling in to clarify Tylenol #3 script. Stated that it came over for pneumonia.    Transferred to Marlee

## 2024-11-01 NOTE — PROGRESS NOTES
motion.      Comments: Pain on palpation   Skin:     General: Skin is warm and dry.   Neurological:      General: No focal deficit present.      Mental Status: She is alert and oriented to person, place, and time.   Psychiatric:         Mood and Affect: Mood normal.         Behavior: Behavior normal.         Thought Content: Thought content normal.         ASSESSMENT/PLAN:    1. Inflammation of shoulder joint  -  there was a concern for intraarticular inflammation due to pain in all directions diffuse shoulder tenderness and visible swelling.  - with Oladoyin's verbal consent, after discussion of procedure, I attempted arthrocentesis under sterile conditions via subacromial approach with 20 gua 1.5 in needle. However, there was no return after 2 attempts.  - this makes an intraarticular process less likely and the procedure was aborted  - further joint palpation revealed exquisite AC joint tenderness.  - I ordered shoulder XR's, medrol dose pack and t#3 for pain over the weekend with orthopedic consultation next week.  (Nsaids to be avoided due to difficult to control HTN)  She is advised to go to ER if pain worsens significantly or has systemic symptoms like fever/chills.    - CBC with Auto Differential; Future  - Manpreet Jean Baptiste MD, Orthopedic Surgery (Hip; Knee; Shoulder), [facility]  - ARTHROCENTESIS ASPIR&/INJ MAJOR JT/BURSA W/US  - XR SHOULDER LEFT (MIN 2 VIEWS); Future  - acetaminophen-codeine (TYLENOL #3) 300-30 MG per tablet; Take 1 tablet by mouth every 6 hours as needed for Pain for up to 4 days. Max Daily Amount: 4 tablets  Dispense: 16 tablet; Refill: 0    2. Type 2 diabetes mellitus with diabetic nephropathy, with long-term current use of insulin ([facility])  - POCT glycosylated hemoglobin (Hb A1C)  A1c is increased today- 7.8  (previously 7.1)  She has a diabetic f/u appt on November 11th.    Return if symptoms worsen or fail to improve.    An  [employer] was used to authenticate

## 2024-11-05 ENCOUNTER — TELEPHONE (OUTPATIENT)
Dept: ORTHOPEDIC SURGERY | Age: 59
End: 2024-11-05

## 2024-11-05 ENCOUNTER — OFFICE VISIT (OUTPATIENT)
Dept: ORTHOPEDIC SURGERY | Age: 59
End: 2024-11-05
Payer: COMMERCIAL

## 2024-11-05 VITALS — HEIGHT: 64 IN | BODY MASS INDEX: 36.54 KG/M2 | WEIGHT: 214 LBS

## 2024-11-05 DIAGNOSIS — M19.012 ARTHRITIS OF LEFT ACROMIOCLAVICULAR JOINT: ICD-10-CM

## 2024-11-05 DIAGNOSIS — S43.402A SPRAIN OF LEFT SHOULDER, UNSPECIFIED SHOULDER SPRAIN TYPE, INITIAL ENCOUNTER: Primary | ICD-10-CM

## 2024-11-05 DIAGNOSIS — M75.102 TEAR OF LEFT ROTATOR CUFF, UNSPECIFIED TEAR EXTENT, UNSPECIFIED WHETHER TRAUMATIC: ICD-10-CM

## 2024-11-05 PROCEDURE — 99214 OFFICE O/P EST MOD 30 MIN: CPT | Performed by: ORTHOPAEDIC SURGERY

## 2024-11-05 NOTE — TELEPHONE ENCOUNTER
[facility] Ortho & Spine Clinical Support    MRI LEFT SHOULDER - NO PRECERT REQ  PER R ONLINE  REF# [id number]  PER KAITLYN @[facility] REF# [id number]    Received insurance approval for MRI to be scheduled at [facility]. Patient may call [phone] to schedule. Once scheduled, patient should call our office back to make a follow-up appointment to go over the results.     I spoke to pt and let her know.

## 2024-11-05 NOTE — PROGRESS NOTES
Chelsy Zhong  [id number]  November 5, 2024    Chief Complaint   Patient presents with    Shoulder Pain     Left       History: The patient is a 59-year-old female who is here for evaluation of her left shoulder.  The patient has had left shoulder pain for approximately 1 month.  She cannot recall a specific injury.  She has difficulty with overhead activities.  She rates the pain as 9/10.  She was recently given a Medrol Dosepak.  She has also been taking Tylenol 3.  She is right-hand dominant.  She denies any numbness or tingling.  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    The patient's  past medical history, medications, allergies,  family history, social history, and have been reviewed, and dated and are recorded in the chart.  Pertinent items are noted in HPI.  Review of systems reviewed from Pertinent History Form dated on 11/5 and available in the patient's chart under the Media tab.     Vitals:  Ht 1.626 m (5' 4\")   Wt 97.1 kg (214 lb)   LMP 09/17/2012   BMI 36.73 kg/m²     Physical: Physical: The patient presents today in no acute distress.She is alert and oriented to person, place and time. She displays appropriate mood and affect.  She is well dressed, nourished and  groomed. She has a normal affect. Examination of the neck reveals no evidence of tenderness. Spurling's sign is negative. Active range of motion of the left shoulder is: 75 degrees abduction, 85 degrees forward flexion, 25 degrees of external rotation and internal rotation to L5. Passive range of motion of the left shoulder is: 130 degrees abduction, 135 degrees forward flexion, 30 degrees of external rotation and internal rotation to L4. Active and passive range of motion of the opposite shoulder is full. Examination of the left shoulder reveals positive Neer and Rojas' impingement signs. There is mild subacromial crepitus with range of motion.       Drop arm test is positive on the left. Speed's test is negative. There is no evidence of tenderness over

## 2024-11-07 DIAGNOSIS — M19.019 INFLAMMATION OF SHOULDER JOINT: Primary | ICD-10-CM

## 2024-11-11 ENCOUNTER — TELEPHONE (OUTPATIENT)
Dept: ORTHOPEDIC SURGERY | Age: 59
End: 2024-11-11

## 2024-11-11 ENCOUNTER — OFFICE VISIT (OUTPATIENT)
Dept: FAMILY MEDICINE CLINIC | Age: 59
End: 2024-11-11

## 2024-11-11 VITALS
BODY MASS INDEX: 36.84 KG/M2 | HEART RATE: 91 BPM | RESPIRATION RATE: 18 BRPM | WEIGHT: 214.6 LBS | OXYGEN SATURATION: 98 % | SYSTOLIC BLOOD PRESSURE: 120 MMHG | DIASTOLIC BLOOD PRESSURE: 80 MMHG

## 2024-11-11 DIAGNOSIS — E11.21 TYPE 2 DIABETES MELLITUS WITH DIABETIC NEPHROPATHY, WITH LONG-TERM CURRENT USE OF INSULIN (HCC): Primary | ICD-10-CM

## 2024-11-11 DIAGNOSIS — I10 PRIMARY HYPERTENSION: Chronic | ICD-10-CM

## 2024-11-11 DIAGNOSIS — Z79.4 TYPE 2 DIABETES MELLITUS WITH DIABETIC NEPHROPATHY, WITH LONG-TERM CURRENT USE OF INSULIN (HCC): Primary | ICD-10-CM

## 2024-11-11 LAB — HBA1C MFR BLD: 7.9 %

## 2024-11-11 RX ORDER — INSULIN DEGLUDEC 200 U/ML
INJECTION, SOLUTION SUBCUTANEOUS
Qty: 3 ML | Refills: 5 | Status: SHIPPED | OUTPATIENT
Start: 2024-11-11

## 2024-11-11 RX ORDER — LOVASTATIN 40 MG/1
40 TABLET ORAL DAILY
Qty: 90 TABLET | Refills: 1 | Status: SHIPPED | OUTPATIENT
Start: 2024-11-11

## 2024-11-11 RX ORDER — METHYLPREDNISOLONE 4 MG/1
TABLET ORAL
Qty: 1 KIT | Refills: 0 | Status: CANCELLED | OUTPATIENT
Start: 2024-11-11 | End: 2024-11-17

## 2024-11-11 RX ORDER — FLURBIPROFEN SODIUM 0.3 MG/ML
SOLUTION/ DROPS OPHTHALMIC
Qty: 100 EACH | Refills: 2 | Status: SHIPPED | OUTPATIENT
Start: 2024-11-11

## 2024-11-11 RX ORDER — AMILORIDE HYDROCHLORIDE AND HYDROCHLOROTHIAZIDE 5; 50 MG/1; MG/1
1 TABLET ORAL DAILY
Qty: 90 TABLET | Refills: 1 | Status: SHIPPED | OUTPATIENT
Start: 2024-11-11

## 2024-11-11 RX ORDER — METOPROLOL SUCCINATE 25 MG/1
25 TABLET, EXTENDED RELEASE ORAL DAILY
Qty: 90 TABLET | Refills: 1 | Status: SHIPPED | OUTPATIENT
Start: 2024-11-11

## 2024-11-11 RX ORDER — GLIMEPIRIDE 4 MG/1
TABLET ORAL
Qty: 90 TABLET | Refills: 1 | Status: SHIPPED | OUTPATIENT
Start: 2024-11-11

## 2024-11-11 RX ORDER — LOSARTAN POTASSIUM 100 MG/1
100 TABLET ORAL DAILY
Qty: 90 TABLET | Refills: 1 | Status: SHIPPED | OUTPATIENT
Start: 2024-11-11

## 2024-11-11 NOTE — TELEPHONE ENCOUNTER
Patient stopped in to the office today stating that her job does not have any light duty work for her and is requesting to be off work until her follow up appt on 11/21/24. I let her know I will ask Dr. Dietz when he is back in office tomorrow and call her.

## 2024-11-11 NOTE — PROGRESS NOTES
2024    Blood pressure (!) 134/90, pulse 91, resp. rate 18, weight 97.3 kg (214 lb 9.6 oz), last menstrual period 2012, SpO2 98%, not currently breastfeeding.    Chelsy Zhong (:  1965) is a 59 y.o. female, here for evaluation of the following medical concerns:    Chief Complaint   Patient presents with    Follow-up    Hypertension    Diabetes     Here for routine follow up of DM/HTN  Has microalbuminuria.    She saw DR Dietz for left shoulder pain.  Suspects RTC tear.  Scheduled for MRI.  Has not been able to work due to the pain.    DM-2:   Monitors with fingerstick glucose levels.  Does not keep log  In the mornings she sees levels 130-150 typically.  Sometimes can be <100.  After meals: about 150.    Her A1c increased to 7.8 today.  Up from 7.1  But was treated with oral steroid 10 days ago, so has had recent hyperglycemia.    DM meds: metformin 1000 bid, Tresiba 40 units, amaryl 4, jardiance 25, trulicity 4.5     Lab Results   Component Value Date/Time    LABA1C 7.8 2024 12:42 PM    LABA1C 7.1 2024 11:42 AM    LABA1C 8.5 2024 12:14 PM      Micral levels have been improving:   Microalb/Creat Ratio (mg/g)   Date Value   2024 36.1 (H)   2023 49.5 (H)   2021 267.9 (H)     She is not using pain medicine currently for shoulder- not using ibu.    Weight is stable (unchanged).    BP meds: amiloride/hct, toprol 25, losartan 100.  No SE's.  Does test BP at home- usually lower at home.    Last lipid test:  Lab Results   Component Value Date    CHOL 136 2024    TRIG 73 2024    HDL 64 (H) 2024     Lab Results   Component Value Date    ALT 12 2024    AST 15 2024                Patient Active Problem List   Diagnosis    Hypertension    Hyperlipidemia    Vitamin D deficiency    Solitary lung nodule- 5mm RML - rescan 2/15.    Type 2 diabetes mellitus with diabetic nephropathy (HCC)    Morbid obesity due to excess calories

## 2024-11-16 ENCOUNTER — HOSPITAL ENCOUNTER (OUTPATIENT)
Dept: MRI IMAGING | Age: 59
Discharge: HOME | End: 2024-11-16
Attending: ORTHOPAEDIC SURGERY
Payer: COMMERCIAL

## 2024-11-16 DIAGNOSIS — S43.402A SPRAIN OF LEFT SHOULDER, UNSPECIFIED SHOULDER SPRAIN TYPE, INITIAL ENCOUNTER: ICD-10-CM

## 2024-11-16 PROCEDURE — 73221 MRI JOINT UPR EXTREM W/O DYE: CPT

## 2024-11-18 ENCOUNTER — TELEPHONE (OUTPATIENT)
Dept: FAMILY MEDICINE CLINIC | Age: 59
End: 2024-11-18

## 2024-11-18 RX ORDER — EXENATIDE 2 MG/.65ML
2 INJECTION, SUSPENSION, EXTENDED RELEASE SUBCUTANEOUS WEEKLY
Qty: 4 PEN | Refills: 5 | Status: SHIPPED | OUTPATIENT
Start: 2024-11-18 | End: 2024-11-18 | Stop reason: SDUPTHER

## 2024-11-18 RX ORDER — EXENATIDE 2 MG/.65ML
2 INJECTION, SUSPENSION, EXTENDED RELEASE SUBCUTANEOUS WEEKLY
Qty: 4 PEN | Refills: 5 | Status: SHIPPED | OUTPATIENT
Start: 2024-11-18

## 2024-11-18 NOTE — TELEPHONE ENCOUNTER
Will try bydureon first.  That is a single dose (no adjustments).  Sent to her [facility] pharmacy.    (The Rx has failed x2, will print and have you fax it)

## 2024-11-21 ENCOUNTER — OFFICE VISIT (OUTPATIENT)
Dept: ORTHOPEDIC SURGERY | Age: 59
End: 2024-11-21

## 2024-11-21 VITALS — BODY MASS INDEX: 36.54 KG/M2 | WEIGHT: 214 LBS | HEIGHT: 64 IN

## 2024-11-21 DIAGNOSIS — M19.012 GLENOHUMERAL ARTHRITIS, LEFT: ICD-10-CM

## 2024-11-21 DIAGNOSIS — M19.012 ARTHRITIS OF LEFT ACROMIOCLAVICULAR JOINT: ICD-10-CM

## 2024-11-21 DIAGNOSIS — M75.82 ROTATOR CUFF TENDINITIS, LEFT: Primary | ICD-10-CM

## 2024-11-21 RX ORDER — BUPIVACAINE HYDROCHLORIDE 2.5 MG/ML
3 INJECTION, SOLUTION INFILTRATION; PERINEURAL ONCE
Status: COMPLETED | OUTPATIENT
Start: 2024-11-21 | End: 2024-11-21

## 2024-11-21 RX ORDER — IBUPROFEN 600 MG/1
600 TABLET, FILM COATED ORAL 2 TIMES DAILY WITH MEALS
Qty: 60 TABLET | Refills: 1 | Status: SHIPPED | OUTPATIENT
Start: 2024-11-21

## 2024-11-21 RX ORDER — TRIAMCINOLONE ACETONIDE 40 MG/ML
80 INJECTION, SUSPENSION INTRA-ARTICULAR; INTRAMUSCULAR ONCE
Status: COMPLETED | OUTPATIENT
Start: 2024-11-21 | End: 2024-11-21

## 2024-11-21 RX ADMIN — BUPIVACAINE HYDROCHLORIDE 7.5 MG: 2.5 INJECTION, SOLUTION INFILTRATION; PERINEURAL at 10:47

## 2024-11-21 RX ADMIN — TRIAMCINOLONE ACETONIDE 80 MG: 40 INJECTION, SUSPENSION INTRA-ARTICULAR; INTRAMUSCULAR at 10:47

## 2024-11-21 NOTE — PROGRESS NOTES
Chelsy Zhong  [id number]  November 21, 2024    Chief Complaint   Patient presents with    Follow-up     Left shoulder MRI results.       History: The patient is a 59-year-old female who is here for evaluation of her left shoulder.  The patient has had left shoulder pain for approximately 6 weeks.  She cannot recall a specific injury.  She has difficulty with overhead activities.  She rates the pain as 9/10.  The Medrol Dosepak provided minimal relief. She is right-hand dominant.  She denies any numbness or tingling.  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  She is here to review her MRI results    The patient's  past medical history, medications, allergies,  family history, social history, and have been reviewed, and dated and are recorded in the chart.  Pertinent items are noted in HPI.  Review of systems reviewed from Pertinent History Form dated on 11/5 and available in the patient's chart under the Media tab.     Vitals:  Ht 1.626 m (5' 4\")   Wt 97.1 kg (214 lb)   LMP 09/17/2012   BMI 36.73 kg/m²     Physical: Physical: The patient presents today in no acute distress.She is alert and oriented to person, place and time. She displays appropriate mood and affect.  She is well dressed, nourished and  groomed. She has a normal affect. Examination of the neck reveals no evidence of tenderness. Spurling's sign is negative. Active range of motion of the left shoulder is: 105 degrees abduction, 105 degrees forward flexion, 35 degrees of external rotation and internal rotation to L4. Passive range of motion of the left shoulder is: 135 degrees abduction, 135 degrees forward flexion, 40 degrees of external rotation and internal rotation to L3. Active and passive range of motion of the opposite shoulder is full. Examination of the left shoulder reveals positive Neer and Rojas' impingement signs. There is mild subacromial crepitus with range of motion.       Drop arm test is negative today. Speed's test is negative. There is no evidence

## 2024-11-22 ENCOUNTER — TELEPHONE (OUTPATIENT)
Dept: FAMILY MEDICINE CLINIC | Age: 59
End: 2024-11-22

## 2024-11-22 NOTE — TELEPHONE ENCOUNTER
See message from 11/18/24    DOP called in stating that her Mom's medication of Trulicity needs to be sent again through Cover My Meds    Please Advise

## 2024-11-25 NOTE — TELEPHONE ENCOUNTER
Submitted PA for Trulicity 4.5MG/0.5ML auto-injectors Via [facility] [id number]  STATUS: PENDING    There was an error with your request  Eligibility could not be verified for this patient - patient not found. Please review patient information.     Called plan at [phone] and spoke with Collette. We found that the patient was not verifying due to them having her last name spelled different. They have Adenrian. Patient should call and speak with Carie about that.     PA is Pending.     Follow up done daily; if no decision with in three days we will refax.  If another three days goes by with no decision will call the insurance for status.

## 2024-11-26 NOTE — TELEPHONE ENCOUNTER
The medication is APPROVED.    Outcome  Approved on November 25 by Martinez MONTAÑO Case: [id number], Status: Approved, Coverage Starts on: 11/25/2024 12:00:00 AM, Coverage Ends on: 11/25/2025 12:00:00 AM.  Authorization Expiration Date: 11/24/2025    If this requires a response please respond to the pool ( P MHCX P[facility] MEDICATION PRE-AUTH).      Thank you please advise patient.

## 2024-12-04 ENCOUNTER — TELEPHONE (OUTPATIENT)
Dept: FAMILY MEDICINE CLINIC | Age: 59
End: 2024-12-04

## 2024-12-04 NOTE — TELEPHONE ENCOUNTER
Patient dropped a form for Dr. Bond to sign    Form is in Dr. Bond's folder    Please call patient when completed

## 2024-12-16 ENCOUNTER — TELEPHONE (OUTPATIENT)
Dept: FAMILY MEDICINE CLINIC | Age: 59
End: 2024-12-16

## 2024-12-16 NOTE — TELEPHONE ENCOUNTER
Called and let the patient know her FMLA forms are finished and have been faxed.  Forms will be scanned into the patient's chart.  No further questions.

## 2024-12-19 RX ORDER — DULAGLUTIDE 4.5 MG/.5ML
INJECTION, SOLUTION SUBCUTANEOUS
Qty: 8 ML | Refills: 5 | Status: SHIPPED | OUTPATIENT
Start: 2024-12-19

## 2025-02-11 ENCOUNTER — OFFICE VISIT (OUTPATIENT)
Dept: FAMILY MEDICINE CLINIC | Age: 60
End: 2025-02-11
Payer: COMMERCIAL

## 2025-02-11 VITALS
HEART RATE: 91 BPM | WEIGHT: 214.6 LBS | HEIGHT: 64 IN | SYSTOLIC BLOOD PRESSURE: 126 MMHG | BODY MASS INDEX: 36.64 KG/M2 | RESPIRATION RATE: 18 BRPM | DIASTOLIC BLOOD PRESSURE: 84 MMHG | OXYGEN SATURATION: 98 %

## 2025-02-11 DIAGNOSIS — E78.00 PURE HYPERCHOLESTEROLEMIA: ICD-10-CM

## 2025-02-11 DIAGNOSIS — Z79.4 TYPE 2 DIABETES MELLITUS WITH DIABETIC NEPHROPATHY, WITH LONG-TERM CURRENT USE OF INSULIN (HCC): ICD-10-CM

## 2025-02-11 DIAGNOSIS — Z12.31 ENCOUNTER FOR SCREENING MAMMOGRAM FOR MALIGNANT NEOPLASM OF BREAST: ICD-10-CM

## 2025-02-11 DIAGNOSIS — E11.21 TYPE 2 DIABETES MELLITUS WITH DIABETIC NEPHROPATHY, WITH LONG-TERM CURRENT USE OF INSULIN (HCC): ICD-10-CM

## 2025-02-11 DIAGNOSIS — I10 PRIMARY HYPERTENSION: ICD-10-CM

## 2025-02-11 DIAGNOSIS — I10 PRIMARY HYPERTENSION: Primary | ICD-10-CM

## 2025-02-11 LAB
ALBUMIN SERPL-MCNC: 4.5 G/DL (ref 3.4–5)
ALBUMIN/GLOB SERPL: 1.4 {RATIO} (ref 1.1–2.2)
ALP SERPL-CCNC: 92 U/L (ref 40–129)
ALT SERPL-CCNC: 21 U/L (ref 10–40)
ANION GAP SERPL CALCULATED.3IONS-SCNC: 10 MMOL/L (ref 3–16)
AST SERPL-CCNC: 18 U/L (ref 15–37)
BILIRUB SERPL-MCNC: 0.4 MG/DL (ref 0–1)
BUN SERPL-MCNC: 24 MG/DL (ref 7–20)
CALCIUM SERPL-MCNC: 10.6 MG/DL (ref 8.3–10.6)
CHLORIDE SERPL-SCNC: 100 MMOL/L (ref 99–110)
CHOLEST SERPL-MCNC: 141 MG/DL (ref 0–199)
CO2 SERPL-SCNC: 29 MMOL/L (ref 21–32)
CREAT SERPL-MCNC: 1.1 MG/DL (ref 0.6–1.2)
CREAT UR-MCNC: 95.4 MG/DL (ref 28–259)
GFR SERPLBLD CREATININE-BSD FMLA CKD-EPI: 57 ML/MIN/{1.73_M2}
GLUCOSE SERPL-MCNC: 127 MG/DL (ref 70–99)
HBA1C MFR BLD: 7.6 %
HDLC SERPL-MCNC: 70 MG/DL (ref 40–60)
LDLC SERPL CALC-MCNC: 55 MG/DL
MICROALBUMIN UR DL<=1MG/L-MCNC: 3.14 MG/DL
MICROALBUMIN/CREAT UR: 32.9 MG/G (ref 0–30)
POTASSIUM SERPL-SCNC: 4.3 MMOL/L (ref 3.5–5.1)
PROT SERPL-MCNC: 7.7 G/DL (ref 6.4–8.2)
SODIUM SERPL-SCNC: 139 MMOL/L (ref 136–145)
TRIGL SERPL-MCNC: 78 MG/DL (ref 0–150)
VLDLC SERPL CALC-MCNC: 16 MG/DL

## 2025-02-11 PROCEDURE — 83036 HEMOGLOBIN GLYCOSYLATED A1C: CPT | Performed by: FAMILY MEDICINE

## 2025-02-11 PROCEDURE — 99214 OFFICE O/P EST MOD 30 MIN: CPT | Performed by: FAMILY MEDICINE

## 2025-02-11 PROCEDURE — 3079F DIAST BP 80-89 MM HG: CPT | Performed by: FAMILY MEDICINE

## 2025-02-11 PROCEDURE — G2211 COMPLEX E/M VISIT ADD ON: HCPCS | Performed by: FAMILY MEDICINE

## 2025-02-11 PROCEDURE — 3051F HG A1C>EQUAL 7.0%<8.0%: CPT | Performed by: FAMILY MEDICINE

## 2025-02-11 PROCEDURE — 3074F SYST BP LT 130 MM HG: CPT | Performed by: FAMILY MEDICINE

## 2025-02-11 RX ORDER — DULAGLUTIDE 4.5 MG/.5ML
INJECTION, SOLUTION SUBCUTANEOUS
Qty: 6 ML | Refills: 1 | Status: SHIPPED | OUTPATIENT
Start: 2025-02-11

## 2025-02-11 RX ORDER — HYDROCHLOROTHIAZIDE 12.5 MG/1
CAPSULE ORAL
Qty: 2 EACH | Refills: 5 | Status: SHIPPED | OUTPATIENT
Start: 2025-02-11

## 2025-02-11 RX ORDER — AMILORIDE HYDROCHLORIDE AND HYDROCHLOROTHIAZIDE 5; 50 MG/1; MG/1
1 TABLET ORAL DAILY
Qty: 90 TABLET | Refills: 1 | Status: SHIPPED | OUTPATIENT
Start: 2025-02-11

## 2025-02-11 RX ORDER — EXENATIDE 2 MG/.65ML
2 INJECTION, SUSPENSION, EXTENDED RELEASE SUBCUTANEOUS WEEKLY
Qty: 4 PEN | Refills: 5 | Status: SHIPPED | OUTPATIENT
Start: 2025-02-11 | End: 2025-02-11

## 2025-02-11 RX ORDER — GLIMEPIRIDE 4 MG/1
TABLET ORAL
Qty: 90 TABLET | Refills: 1 | Status: SHIPPED | OUTPATIENT
Start: 2025-02-11

## 2025-02-11 RX ORDER — LOVASTATIN 40 MG/1
40 TABLET ORAL DAILY
Qty: 90 TABLET | Refills: 1 | Status: SHIPPED | OUTPATIENT
Start: 2025-02-11

## 2025-02-11 RX ORDER — METOPROLOL SUCCINATE 25 MG/1
25 TABLET, EXTENDED RELEASE ORAL DAILY
Qty: 90 TABLET | Refills: 1 | Status: SHIPPED | OUTPATIENT
Start: 2025-02-11

## 2025-02-11 SDOH — ECONOMIC STABILITY: FOOD INSECURITY: WITHIN THE PAST 12 MONTHS, THE FOOD YOU BOUGHT JUST DIDN'T LAST AND YOU DIDN'T HAVE MONEY TO GET MORE.: NEVER TRUE

## 2025-02-11 SDOH — ECONOMIC STABILITY: FOOD INSECURITY: WITHIN THE PAST 12 MONTHS, YOU WORRIED THAT YOUR FOOD WOULD RUN OUT BEFORE YOU GOT MONEY TO BUY MORE.: NEVER TRUE

## 2025-02-11 ASSESSMENT — PATIENT HEALTH QUESTIONNAIRE - PHQ9
SUM OF ALL RESPONSES TO PHQ QUESTIONS 1-9: 0
SUM OF ALL RESPONSES TO PHQ QUESTIONS 1-9: 0
1. LITTLE INTEREST OR PLEASURE IN DOING THINGS: NOT AT ALL
2. FEELING DOWN, DEPRESSED OR HOPELESS: NOT AT ALL
SUM OF ALL RESPONSES TO PHQ9 QUESTIONS 1 & 2: 0
SUM OF ALL RESPONSES TO PHQ QUESTIONS 1-9: 0
SUM OF ALL RESPONSES TO PHQ QUESTIONS 1-9: 0

## 2025-02-11 NOTE — PROGRESS NOTES
Regular physical activity and healthy diet (low sodium, multiple servings of produce daily) was recommended.  Renal function to be assessed yearly.     Orders:    Comprehensive Metabolic Panel; Future    Pure hypercholesterolemia  - Stable; continue statin therapy    Orders:    Lipid Panel; Future    Encounter for screening mammogram for malignant neoplasm of breast   - encouraged to schedule mammogram soon.    Orders:    JOSE DIGITAL SCREEN W OR WO CAD BILATERAL; Future      Return in about 3 months (around 5/11/2025) for follow up diabetes.    An  KARALITignature was used to authenticate this note.    --Rasheed Bond MD on 2/11/2025 at 10:52 AM

## 2025-02-11 NOTE — ASSESSMENT & PLAN NOTE
A1c improving, not to goal  In lieu of changing meds, will begin use of CGM (Jacek 3+) to increase glycemic awareness; discussed how CGM's work (interstitial vs blood glucose) and his fasting/2 hr goals.  Discussed the carb exchange and her goal of 45-60 gm or less CHO per meal.   \  Continue jardiance and losartan for protienuria.    Orders:    POCT glycosylated hemoglobin (Hb A1C)    HM DIABETES FOOT EXAM    Albumin/Creatinine Ratio, Urine; Future     Rhofade Counseling: Rhofade is a topical medication which can decrease superficial blood flow where applied. Side effects are uncommon and include stinging, redness and allergic reactions.

## 2025-02-18 ENCOUNTER — TELEPHONE (OUTPATIENT)
Dept: FAMILY MEDICINE CLINIC | Age: 60
End: 2025-02-18

## 2025-02-18 NOTE — TELEPHONE ENCOUNTER
Dr Dietz managed her left shoulder pain. I would recommend she follow up with him.  I thought she was improving.  She has not seen ortho since November.

## 2025-02-19 ENCOUNTER — TELEPHONE (OUTPATIENT)
Dept: ORTHOPEDIC SURGERY | Age: 60
End: 2025-02-19

## 2025-02-19 NOTE — TELEPHONE ENCOUNTER
General Question     Subject: LIGHT DUTY LETTER  Patient and /or Facility Request: Chelsy Zhong   Contact Number: [phone]      PATIENT DAUGHTER CALLED WANTING TO KNOW IF THE PATIENT CAN BE EMAILED A LIGHT DUTY LETTER    EMAIL: [email or web address]    PLEASE CALL BACK THE ABOVE NUMBER  ONCE HANDLED

## 2025-02-20 ENCOUNTER — OFFICE VISIT (OUTPATIENT)
Dept: ORTHOPEDIC SURGERY | Age: 60
End: 2025-02-20

## 2025-02-20 VITALS — BODY MASS INDEX: 36.54 KG/M2 | WEIGHT: 214 LBS | HEIGHT: 64 IN | RESPIRATION RATE: 16 BRPM

## 2025-02-20 DIAGNOSIS — M19.012 ARTHRITIS OF LEFT ACROMIOCLAVICULAR JOINT: ICD-10-CM

## 2025-02-20 DIAGNOSIS — M19.012 GLENOHUMERAL ARTHRITIS, LEFT: ICD-10-CM

## 2025-02-20 DIAGNOSIS — M75.82 ROTATOR CUFF TENDINITIS, LEFT: Primary | ICD-10-CM

## 2025-02-20 DIAGNOSIS — M25.812 SHOULDER IMPINGEMENT, LEFT: ICD-10-CM

## 2025-02-20 RX ORDER — METHYLPREDNISOLONE 4 MG/1
TABLET ORAL
Qty: 1 KIT | Refills: 0 | Status: SHIPPED | OUTPATIENT
Start: 2025-02-20 | End: 2025-02-26

## 2025-02-20 RX ORDER — MELOXICAM 15 MG/1
15 TABLET ORAL DAILY PRN
Qty: 30 TABLET | Refills: 0 | Status: SHIPPED | OUTPATIENT
Start: 2025-02-20

## 2025-02-20 NOTE — PROGRESS NOTES
Chelsy Zhong  [id number]  February 20, 2025    Chief Complaint   Patient presents with    Follow-up     L shoulder       History: The patient is a 59-year-old female who is here for evaluation of her left shoulder.  The patient has had left shoulder pain for approximately 4 months.  She cannot recall a specific injury.  She has difficulty with overhead activities.  The left shoulder injection received on 11/21/2024 provided moderate relief briefly.  She is performing home exercises.  She has difficulty with overhead activities. She is right-hand dominant.  She denies any numbness or tingling.  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      The patient's  past medical history, medications, allergies,  family history, social history, and have been reviewed, and dated and are recorded in the chart.  Pertinent items are noted in HPI.  Review of systems reviewed from Pertinent History Form dated on 11/5 and available in the patient's chart under the Media tab.     Vitals:  Resp 16   Ht 1.626 m (5' 4\")   Wt 97.1 kg (214 lb)   LMP 09/17/2012   BMI 36.73 kg/m²     Physical: Physical: The patient presents today in no acute distress.She is alert and oriented to person, place and time. She displays appropriate mood and affect.  She is well dressed, nourished and  groomed. She has a normal affect. Examination of the neck reveals no evidence of tenderness. Spurling's sign is negative. Active range of motion of the left shoulder is: 105 degrees abduction, 105 degrees forward flexion, 35 degrees of external rotation and internal rotation to L4. Passive range of motion of the left shoulder is: 135 degrees abduction, 135 degrees forward flexion, 40 degrees of external rotation and internal rotation to L3. Active and passive range of motion of the opposite shoulder is full. Examination of the left shoulder reveals positive Neer and Rojas' impingement signs. There is mild subacromial crepitus with range of motion.       Drop arm test is negative

## 2025-03-24 RX ORDER — LOSARTAN POTASSIUM 100 MG/1
100 TABLET ORAL DAILY
Qty: 90 TABLET | Refills: 1 | Status: SHIPPED | OUTPATIENT
Start: 2025-03-24

## 2025-04-28 ENCOUNTER — TELEPHONE (OUTPATIENT)
Dept: FAMILY MEDICINE CLINIC | Age: 60
End: 2025-04-28

## 2025-04-28 NOTE — TELEPHONE ENCOUNTER
Patient stopped in wanting to know if we received a form from [employer] for her prescription savings plan for Dr. Bond to fill out.    She said they sent it last week    Please Advise

## 2025-05-09 ENCOUNTER — TELEPHONE (OUTPATIENT)
Dept: FAMILY MEDICINE CLINIC | Age: 60
End: 2025-05-09

## 2025-05-09 DIAGNOSIS — Z79.4 TYPE 2 DIABETES MELLITUS WITH DIABETIC NEPHROPATHY, WITH LONG-TERM CURRENT USE OF INSULIN (HCC): Primary | ICD-10-CM

## 2025-05-09 DIAGNOSIS — E11.21 TYPE 2 DIABETES MELLITUS WITH DIABETIC NEPHROPATHY, WITH LONG-TERM CURRENT USE OF INSULIN (HCC): Primary | ICD-10-CM

## 2025-05-09 RX ORDER — DULAGLUTIDE 4.5 MG/.5ML
INJECTION, SOLUTION SUBCUTANEOUS
Qty: 6 ML | Refills: 2 | Status: SHIPPED | OUTPATIENT
Start: 2025-05-09

## 2025-05-09 NOTE — TELEPHONE ENCOUNTER
Done.   Thanks for hour help for Chelsy!    She is due for f/u soon for diabetes.  Please see if she wants to schedule that now.    Thanks.

## 2025-05-09 NOTE — TELEPHONE ENCOUNTER
Need paper copy of Trulicty to fax with pt assistance form   Form is completed and will fax w Rx once signed

## 2025-05-12 NOTE — TELEPHONE ENCOUNTER
Called pt and let her know forms completed and faxed for Trulicity and Jardiance  Offer to sched appt but she declined at this time  States she will call back for this

## (undated) DEVICE — Z DISCONTINUED USE 2272117 DRAPE SURG 3 QTR N INVASIVE 2 LAYR DISP

## (undated) DEVICE — SYRINGE, LUER LOCK, 5ML: Brand: MEDLINE

## (undated) DEVICE — SOLUTION IRRIG 250ML STRL H2O PLAS POUR BTL USP

## (undated) DEVICE — GOWN SIRUS NONREIN LG W/TWL: Brand: MEDLINE INDUSTRIES, INC.

## (undated) DEVICE — GLOVE SURG SZ 8 L12IN FNGR THK87MIL WHT LTX FREE

## (undated) DEVICE — DRAPE,UTILITY,TAPE,15X26,STERILE: Brand: MEDLINE

## (undated) DEVICE — MARKER,SKIN,WI/RULER AND LABELS: Brand: MEDLINE

## (undated) DEVICE — COVER,MAYO STAND,STERILE: Brand: MEDLINE

## (undated) DEVICE — NEEDLE HYPO 25GA L1.5IN BLU POLYPR HUB S STL REG BVL STR

## (undated) DEVICE — APPLICATOR MEDICATED 10.5 CC SOLUTION CLR STRL CHLORAPREP

## (undated) DEVICE — GEL US 20GM NONIRRITATING OVERWRAPPED FILE PCH TRNSMIT

## (undated) DEVICE — TOWEL,OR,DSP,ST,BLUE,DLX,2/PK,40PK/CS: Brand: MEDLINE

## (undated) DEVICE — COVER US PRB W10XL61CM W/ GEL RUBBERBAND TAPE STRP FLD GEN

## (undated) DEVICE — COOLIEF* COOLED RADIOFREQUENCY PROBE KIT: Brand: AVANOS

## (undated) DEVICE — PAD GRND FOR RF PAIN MGMT DISP

## (undated) DEVICE — NDL CNTR 20CT FM MAG: Brand: MEDLINE INDUSTRIES, INC.